# Patient Record
Sex: FEMALE | Race: BLACK OR AFRICAN AMERICAN | NOT HISPANIC OR LATINO | URBAN - METROPOLITAN AREA
[De-identification: names, ages, dates, MRNs, and addresses within clinical notes are randomized per-mention and may not be internally consistent; named-entity substitution may affect disease eponyms.]

---

## 2017-02-09 ENCOUNTER — INPATIENT (INPATIENT)
Facility: HOSPITAL | Age: 22
LOS: 1 days | Discharge: HOME | DRG: 200 | End: 2017-02-11
Attending: HOSPITALIST | Admitting: HOSPITALIST
Payer: COMMERCIAL

## 2017-02-09 VITALS
OXYGEN SATURATION: 97 % | WEIGHT: 259.93 LBS | TEMPERATURE: 99 F | RESPIRATION RATE: 22 BRPM | DIASTOLIC BLOOD PRESSURE: 80 MMHG | SYSTOLIC BLOOD PRESSURE: 116 MMHG | HEART RATE: 99 BPM | HEIGHT: 66 IN

## 2017-02-09 PROBLEM — Z00.00 ENCOUNTER FOR PREVENTIVE HEALTH EXAMINATION: Status: ACTIVE | Noted: 2017-02-09

## 2017-02-09 LAB
ALBUMIN SERPL ELPH-MCNC: 4.3 G/DL — SIGNIFICANT CHANGE UP (ref 3.3–5.2)
ALP SERPL-CCNC: 81 U/L — SIGNIFICANT CHANGE UP (ref 40–120)
ALT FLD-CCNC: 20 U/L — SIGNIFICANT CHANGE UP
ANION GAP SERPL CALC-SCNC: 13 MMOL/L — SIGNIFICANT CHANGE UP (ref 5–17)
APTT BLD: 28.1 SEC — SIGNIFICANT CHANGE UP (ref 27.5–37.4)
AST SERPL-CCNC: 31 U/L — SIGNIFICANT CHANGE UP
BASOPHILS # BLD AUTO: 0 K/UL — SIGNIFICANT CHANGE UP (ref 0–0.2)
BASOPHILS NFR BLD AUTO: 0.1 % — SIGNIFICANT CHANGE UP (ref 0–2)
BILIRUB SERPL-MCNC: 0.5 MG/DL — SIGNIFICANT CHANGE UP (ref 0.4–2)
BUN SERPL-MCNC: 13 MG/DL — SIGNIFICANT CHANGE UP (ref 8–20)
CALCIUM SERPL-MCNC: 9.2 MG/DL — SIGNIFICANT CHANGE UP (ref 8.6–10.2)
CHLORIDE SERPL-SCNC: 99 MMOL/L — SIGNIFICANT CHANGE UP (ref 98–107)
CK MB CFR SERPL CALC: 4.8 NG/ML — SIGNIFICANT CHANGE UP (ref 0–6.7)
CK SERPL-CCNC: 1681 U/L — HIGH (ref 25–170)
CO2 SERPL-SCNC: 28 MMOL/L — SIGNIFICANT CHANGE UP (ref 22–29)
CREAT SERPL-MCNC: 0.9 MG/DL — SIGNIFICANT CHANGE UP (ref 0.5–1.3)
EOSINOPHIL # BLD AUTO: 0 K/UL — SIGNIFICANT CHANGE UP (ref 0–0.5)
EOSINOPHIL NFR BLD AUTO: 0.2 % — SIGNIFICANT CHANGE UP (ref 0–6)
GLUCOSE SERPL-MCNC: 101 MG/DL — SIGNIFICANT CHANGE UP (ref 70–115)
HCG SERPL-ACNC: <2 MIU/ML — SIGNIFICANT CHANGE UP
HCT VFR BLD CALC: 41.2 % — SIGNIFICANT CHANGE UP (ref 37–47)
HGB BLD-MCNC: 14.2 G/DL — SIGNIFICANT CHANGE UP (ref 12–16)
INR BLD: 1.34 RATIO — HIGH (ref 0.88–1.16)
LACTATE BLDV-MCNC: 1.3 MMOL/L — SIGNIFICANT CHANGE UP (ref 0.7–2)
LYMPHOCYTES # BLD AUTO: 13.2 % — LOW (ref 20–55)
LYMPHOCYTES # BLD AUTO: 2.4 K/UL — SIGNIFICANT CHANGE UP (ref 1–4.8)
MCHC RBC-ENTMCNC: 30.3 PG — SIGNIFICANT CHANGE UP (ref 27–31)
MCHC RBC-ENTMCNC: 34.5 G/DL — SIGNIFICANT CHANGE UP (ref 32–36)
MCV RBC AUTO: 88 FL — SIGNIFICANT CHANGE UP (ref 81–99)
MONOCYTES # BLD AUTO: 0.9 K/UL — HIGH (ref 0–0.8)
MONOCYTES NFR BLD AUTO: 4.9 % — SIGNIFICANT CHANGE UP (ref 3–10)
NEUTROPHILS # BLD AUTO: 15 K/UL — HIGH (ref 1.8–8)
NEUTROPHILS NFR BLD AUTO: 81.3 % — HIGH (ref 37–73)
PLATELET # BLD AUTO: 290 K/UL — SIGNIFICANT CHANGE UP (ref 150–400)
POTASSIUM SERPL-MCNC: 3 MMOL/L — LOW (ref 3.5–5.3)
POTASSIUM SERPL-SCNC: 3 MMOL/L — LOW (ref 3.5–5.3)
PROT SERPL-MCNC: 8.1 G/DL — SIGNIFICANT CHANGE UP (ref 6.6–8.7)
PROTHROM AB SERPL-ACNC: 14.8 SEC — HIGH (ref 10–13.1)
RBC # BLD: 4.68 M/UL — SIGNIFICANT CHANGE UP (ref 4.4–5.2)
RBC # FLD: 12.6 % — SIGNIFICANT CHANGE UP (ref 11–15.6)
SODIUM SERPL-SCNC: 140 MMOL/L — SIGNIFICANT CHANGE UP (ref 135–145)
TROPONIN T SERPL-MCNC: <0.01 NG/ML — SIGNIFICANT CHANGE UP (ref 0–0.06)
WBC # BLD: 18.37 K/UL — HIGH (ref 4.8–10.8)
WBC # FLD AUTO: 18.37 K/UL — HIGH (ref 4.8–10.8)

## 2017-02-09 PROCEDURE — 71010: CPT | Mod: 26

## 2017-02-09 PROCEDURE — 93010 ELECTROCARDIOGRAM REPORT: CPT

## 2017-02-09 RX ORDER — SODIUM CHLORIDE 9 MG/ML
1000 INJECTION INTRAMUSCULAR; INTRAVENOUS; SUBCUTANEOUS
Qty: 0 | Refills: 0 | Status: COMPLETED | OUTPATIENT
Start: 2017-02-09 | End: 2017-02-09

## 2017-02-09 RX ORDER — POTASSIUM CHLORIDE 20 MEQ
40 PACKET (EA) ORAL ONCE
Qty: 0 | Refills: 0 | Status: COMPLETED | OUTPATIENT
Start: 2017-02-09 | End: 2017-02-09

## 2017-02-09 RX ADMIN — SODIUM CHLORIDE 2000 MILLILITER(S): 9 INJECTION INTRAMUSCULAR; INTRAVENOUS; SUBCUTANEOUS at 23:50

## 2017-02-09 RX ADMIN — SODIUM CHLORIDE 2000 MILLILITER(S): 9 INJECTION INTRAMUSCULAR; INTRAVENOUS; SUBCUTANEOUS at 22:10

## 2017-02-09 NOTE — ED ADULT TRIAGE NOTE - CHIEF COMPLAINT QUOTE
Patient comes to ER complaining of difficulty breathing, hyperventilating, episode after shoveling snow, resp even/labored, lungs CTAB.

## 2017-02-09 NOTE — ED ADULT NURSE NOTE - OBJECTIVE STATEMENT
received pt AOx3 c/o dyspnea, numbness and tingling in LE, dizziness and weakness, s/p shoveling for 5+hours today. Pt denies chest pain and vomiting but states she felt nausea earlier today. Pt states she takes a diet pill but is unsure of the name and did not take it today. pt respirations even unlabored, MAEx4, neuro intact, will continue to monitor.

## 2017-02-10 DIAGNOSIS — J98.2 INTERSTITIAL EMPHYSEMA: ICD-10-CM

## 2017-02-10 DIAGNOSIS — E87.6 HYPOKALEMIA: ICD-10-CM

## 2017-02-10 DIAGNOSIS — M62.82 RHABDOMYOLYSIS: ICD-10-CM

## 2017-02-10 LAB
ANION GAP SERPL CALC-SCNC: 12 MMOL/L — SIGNIFICANT CHANGE UP (ref 5–17)
APPEARANCE UR: CLEAR — SIGNIFICANT CHANGE UP
BACTERIA # UR AUTO: ABNORMAL
BASOPHILS # BLD AUTO: 0 K/UL — SIGNIFICANT CHANGE UP (ref 0–0.2)
BASOPHILS NFR BLD AUTO: 0.1 % — SIGNIFICANT CHANGE UP (ref 0–2)
BILIRUB UR-MCNC: NEGATIVE — SIGNIFICANT CHANGE UP
BUN SERPL-MCNC: 8 MG/DL — SIGNIFICANT CHANGE UP (ref 8–20)
CALCIUM SERPL-MCNC: 9 MG/DL — SIGNIFICANT CHANGE UP (ref 8.6–10.2)
CHLORIDE SERPL-SCNC: 101 MMOL/L — SIGNIFICANT CHANGE UP (ref 98–107)
CK MB CFR SERPL CALC: 4.8 NG/ML — SIGNIFICANT CHANGE UP (ref 0–6.7)
CK SERPL-CCNC: 1558 U/L — HIGH (ref 25–170)
CO2 SERPL-SCNC: 24 MMOL/L — SIGNIFICANT CHANGE UP (ref 22–29)
COLOR SPEC: YELLOW — SIGNIFICANT CHANGE UP
CREAT SERPL-MCNC: 0.65 MG/DL — SIGNIFICANT CHANGE UP (ref 0.5–1.3)
DIFF PNL FLD: ABNORMAL
EOSINOPHIL # BLD AUTO: 0.1 K/UL — SIGNIFICANT CHANGE UP (ref 0–0.5)
EOSINOPHIL NFR BLD AUTO: 1 % — SIGNIFICANT CHANGE UP (ref 0–6)
EPI CELLS # UR: SIGNIFICANT CHANGE UP
GLUCOSE SERPL-MCNC: 72 MG/DL — SIGNIFICANT CHANGE UP (ref 70–115)
GLUCOSE UR QL: NEGATIVE MG/DL — SIGNIFICANT CHANGE UP
HCT VFR BLD CALC: 36.7 % — LOW (ref 37–47)
HGB BLD-MCNC: 12.6 G/DL — SIGNIFICANT CHANGE UP (ref 12–16)
KETONES UR-MCNC: ABNORMAL
LACTATE BLDV-MCNC: 0.8 MMOL/L — SIGNIFICANT CHANGE UP (ref 0.7–2)
LEUKOCYTE ESTERASE UR-ACNC: ABNORMAL
LYMPHOCYTES # BLD AUTO: 2.3 K/UL — SIGNIFICANT CHANGE UP (ref 1–4.8)
LYMPHOCYTES # BLD AUTO: 23.7 % — SIGNIFICANT CHANGE UP (ref 20–55)
MAGNESIUM SERPL-MCNC: 2.3 MG/DL — SIGNIFICANT CHANGE UP (ref 1.8–2.5)
MCHC RBC-ENTMCNC: 30.3 PG — SIGNIFICANT CHANGE UP (ref 27–31)
MCHC RBC-ENTMCNC: 34.3 G/DL — SIGNIFICANT CHANGE UP (ref 32–36)
MCV RBC AUTO: 88.2 FL — SIGNIFICANT CHANGE UP (ref 81–99)
MONOCYTES # BLD AUTO: 0.6 K/UL — SIGNIFICANT CHANGE UP (ref 0–0.8)
MONOCYTES NFR BLD AUTO: 6.7 % — SIGNIFICANT CHANGE UP (ref 3–10)
NEUTROPHILS # BLD AUTO: 6.5 K/UL — SIGNIFICANT CHANGE UP (ref 1.8–8)
NEUTROPHILS NFR BLD AUTO: 68.4 % — SIGNIFICANT CHANGE UP (ref 37–73)
NITRITE UR-MCNC: NEGATIVE — SIGNIFICANT CHANGE UP
PH UR: 6 — SIGNIFICANT CHANGE UP (ref 4.8–8)
PLATELET # BLD AUTO: 257 K/UL — SIGNIFICANT CHANGE UP (ref 150–400)
POTASSIUM SERPL-MCNC: 3.6 MMOL/L — SIGNIFICANT CHANGE UP (ref 3.5–5.3)
POTASSIUM SERPL-SCNC: 3.6 MMOL/L — SIGNIFICANT CHANGE UP (ref 3.5–5.3)
PROT UR-MCNC: 30 MG/DL
RBC # BLD: 4.16 M/UL — LOW (ref 4.4–5.2)
RBC # FLD: 12.7 % — SIGNIFICANT CHANGE UP (ref 11–15.6)
RBC CASTS # UR COMP ASSIST: ABNORMAL /HPF (ref 0–4)
SODIUM SERPL-SCNC: 137 MMOL/L — SIGNIFICANT CHANGE UP (ref 135–145)
SP GR SPEC: 1.01 — SIGNIFICANT CHANGE UP (ref 1.01–1.02)
TROPONIN T SERPL-MCNC: <0.01 NG/ML — SIGNIFICANT CHANGE UP (ref 0–0.06)
UROBILINOGEN FLD QL: 4 MG/DL
WBC # BLD: 9.53 K/UL — SIGNIFICANT CHANGE UP (ref 4.8–10.8)
WBC # FLD AUTO: 9.53 K/UL — SIGNIFICANT CHANGE UP (ref 4.8–10.8)
WBC UR QL: ABNORMAL

## 2017-02-10 PROCEDURE — 99223 1ST HOSP IP/OBS HIGH 75: CPT

## 2017-02-10 PROCEDURE — 71275 CT ANGIOGRAPHY CHEST: CPT | Mod: 26

## 2017-02-10 PROCEDURE — 99285 EMERGENCY DEPT VISIT HI MDM: CPT

## 2017-02-10 RX ORDER — PIPERACILLIN AND TAZOBACTAM 4; .5 G/20ML; G/20ML
3.38 INJECTION, POWDER, LYOPHILIZED, FOR SOLUTION INTRAVENOUS ONCE
Qty: 0 | Refills: 0 | Status: COMPLETED | OUTPATIENT
Start: 2017-02-10 | End: 2017-02-10

## 2017-02-10 RX ORDER — ACETAMINOPHEN 500 MG
650 TABLET ORAL EVERY 6 HOURS
Qty: 0 | Refills: 0 | Status: DISCONTINUED | OUTPATIENT
Start: 2017-02-10 | End: 2017-02-11

## 2017-02-10 RX ORDER — POTASSIUM CHLORIDE 20 MEQ
40 PACKET (EA) ORAL ONCE
Qty: 0 | Refills: 0 | Status: COMPLETED | OUTPATIENT
Start: 2017-02-10 | End: 2017-02-10

## 2017-02-10 RX ORDER — LACTOBACILLUS ACIDOPHILUS 100MM CELL
1 CAPSULE ORAL
Qty: 0 | Refills: 0 | Status: DISCONTINUED | OUTPATIENT
Start: 2017-02-10 | End: 2017-02-11

## 2017-02-10 RX ORDER — SODIUM CHLORIDE 9 MG/ML
1000 INJECTION, SOLUTION INTRAVENOUS
Qty: 0 | Refills: 0 | Status: DISCONTINUED | OUTPATIENT
Start: 2017-02-10 | End: 2017-02-11

## 2017-02-10 RX ORDER — PIPERACILLIN AND TAZOBACTAM 4; .5 G/20ML; G/20ML
3.38 INJECTION, POWDER, LYOPHILIZED, FOR SOLUTION INTRAVENOUS EVERY 8 HOURS
Qty: 0 | Refills: 0 | Status: DISCONTINUED | OUTPATIENT
Start: 2017-02-10 | End: 2017-02-11

## 2017-02-10 RX ORDER — VANCOMYCIN HCL 1 G
1750 VIAL (EA) INTRAVENOUS EVERY 12 HOURS
Qty: 0 | Refills: 0 | Status: DISCONTINUED | OUTPATIENT
Start: 2017-02-10 | End: 2017-02-11

## 2017-02-10 RX ORDER — MAGNESIUM SULFATE 500 MG/ML
2 VIAL (ML) INJECTION ONCE
Qty: 0 | Refills: 0 | Status: COMPLETED | OUTPATIENT
Start: 2017-02-10 | End: 2017-02-10

## 2017-02-10 RX ADMIN — PIPERACILLIN AND TAZOBACTAM 200 GRAM(S): 4; .5 INJECTION, POWDER, LYOPHILIZED, FOR SOLUTION INTRAVENOUS at 03:58

## 2017-02-10 RX ADMIN — Medication 250 MILLIGRAM(S): at 22:08

## 2017-02-10 RX ADMIN — Medication 40 MILLIEQUIVALENT(S): at 00:52

## 2017-02-10 RX ADMIN — Medication 250 MILLIGRAM(S): at 05:19

## 2017-02-10 RX ADMIN — Medication 50 GRAM(S): at 00:52

## 2017-02-10 RX ADMIN — SODIUM CHLORIDE 250 MILLILITER(S): 9 INJECTION, SOLUTION INTRAVENOUS at 04:39

## 2017-02-10 RX ADMIN — PIPERACILLIN AND TAZOBACTAM 25 GRAM(S): 4; .5 INJECTION, POWDER, LYOPHILIZED, FOR SOLUTION INTRAVENOUS at 14:40

## 2017-02-10 RX ADMIN — SODIUM CHLORIDE 200 MILLILITER(S): 9 INJECTION, SOLUTION INTRAVENOUS at 10:42

## 2017-02-10 RX ADMIN — PIPERACILLIN AND TAZOBACTAM 25 GRAM(S): 4; .5 INJECTION, POWDER, LYOPHILIZED, FOR SOLUTION INTRAVENOUS at 23:26

## 2017-02-10 RX ADMIN — SODIUM CHLORIDE 200 MILLILITER(S): 9 INJECTION, SOLUTION INTRAVENOUS at 23:36

## 2017-02-10 RX ADMIN — SODIUM CHLORIDE 2000 MILLILITER(S): 9 INJECTION INTRAMUSCULAR; INTRAVENOUS; SUBCUTANEOUS at 00:52

## 2017-02-10 RX ADMIN — Medication 1 TABLET(S): at 18:55

## 2017-02-10 NOTE — DISCHARGE NOTE ADULT - HOSPITAL COURSE
21 years old female with no known PMH comes with shortness of breath. As per patient, she shovelled snow for 5 hours and after finishing went to take shower. While taking shower she started feeling dizzy and short of breath associated with numbness and tingling in arms and face. She was also feeling very nauseous but denies any vomiting. The whole episode lasted for 45 minutes. Family called EMS and she was brought to ER.   Denies any fever, recent infection (chest, neck, ear, nose or throat). Denies any recent contact. Denies any chest pain, pressure or palpitations.   Found to have Pneumomediastinum on CTA Chest.  IV hydration for rhabdomyolysis 21 years old female with no known PMH comes with shortness of breath. As per patient, she shovelled snow for 5 hours and after finishing went to take shower. While taking shower she started feeling dizzy and short of breath associated with numbness and tingling in arms and face. She was also feeling very nauseous but denies any vomiting. The whole episode lasted for 45 minutes. Family called EMS and she was brought to ER.   Denies any fever, recent infection (chest, neck, ear, nose or throat). Denies any recent contact. Denies any chest pain, pressure or palpitations.   Found to have Pneumomediastinum on CTA Chest.  IV hydration for rhabdomyolysis     cleared by ct surgery no need for repeat ct  patient advised to inc hydration and bed rest. ck improved

## 2017-02-10 NOTE — ED PROVIDER NOTE - HEME/LYMPH [-], MLM
no travel, no trauma, no smoking, no extremity swelling, no personal or family hx of VTE, no exogenous estrogen/no easy bleeding/no easy bruising

## 2017-02-10 NOTE — H&P ADULT. - NEUROLOGICAL DETAILS
responds to verbal commands/sensation intact/normal strength/cranial nerves intact/alert and oriented x 3

## 2017-02-10 NOTE — DISCHARGE NOTE ADULT - CARE PROVIDERS DIRECT ADDRESSES
,DirectAddress_Unknown,DirectAddress_Unknown ,DirectAddress_Unknown,[email or web address],DirectAddress_Unknown

## 2017-02-10 NOTE — H&P ADULT. - ASSESSMENT
21 years old female with no known PMH comes with shortness of breath. As per patient, she shovelled snow for 5 hours and after finishing went to take shower. While taking shower she started feeling dizzy and short of breath associated with numbness and tingling in arms and face. She was also feeling very nauseous but denies any vomiting. The whole episode lasted for 45 minutes. Family called EMS and she was brought to ER.   Denies any fever, recent infection (chest, neck, ear, nose or throat). Denies any recent contact. Denies any chest pain, pressure or palpitations.   Found to have Pneumomediastinum on CTA Chest.    1) Pneumomediastinum  - Seen by CTS -- recommended broad spectrum antibiotics and repeat CT Chest in 24-48 hours  - Got Vacno and Zosyn in ER -- will continue for now  2) Leukocytosis  - Cultures sent  - Vanco & Zosyn till cultures are back  3) Rhabdomyolysis  - Got 1 liter bolus. Will continue IVF at 200 ml/hour  - Monitor renal function and CPK  4) Hypomagnesemia and Hypokalemia  - Magnesium 2 gm and KCl 40 mEq given in ER. Will give one more dose of KCl.  5) Abnormal UA  - No signs of UTI  - Patient is due for her periods and was having some spotting.  DVT Prophylaxis -- Early ambulation.                              VCD boots while in bed.

## 2017-02-10 NOTE — DISCHARGE NOTE ADULT - PATIENT PORTAL LINK FT
“You can access the [facility] Patient Portal, offered by [facility], by registering with the following website: [email or web address]”

## 2017-02-10 NOTE — PROGRESS NOTE ADULT - ASSESSMENT
20 yo Female no significant PMH was shoveling snow 2/9 for 5 hours went inside to take a shower began to feel SOB was breathing heavily. Pt BIBEMS hypotensive in ED80/40s given fluid bolus which she responded. Pt interviewed at bedside doing well no complaints of CP or SIB at this time. Pt denies any LOC, syncope headaches vomit ting or retching  during episode of SOB at home.
21 years old female with no known PMH comes with shortness of breath. As per patient, she shovelled snow for 5 hours and after finishing went to take shower. While taking shower she started feeling dizzy and short of breath associated with numbness and tingling in arms and face. She was also feeling very nauseous but denies any vomiting. The whole episode lasted for 45 minutes. Family called EMS and she was brought to ER.   Denies any fever, recent infection (chest, neck, ear, nose or throat). Denies any recent contact. Denies any chest pain, pressure or palpitations.   Found to have Pneumomediastinum on CTA Chest.  IV hydration for rhabdomyolysis

## 2017-02-10 NOTE — DISCHARGE NOTE ADULT - PROVIDER TOKENS
FREE:[LAST:[PMD],PHONE:[(   )    -],FAX:[(   )    -]] FREE:[LAST:[PMD],PHONE:[(   )    -],FAX:[(   )    -]],TOKEN:'[id number]'

## 2017-02-10 NOTE — PROGRESS NOTE ADULT - PROBLEM SELECTOR PLAN 1
strict NPO  broad spectrum abx's  admit to medicine   IV hydration  repeat CT scan in 24-48 hours   CT-Surgery will follow as consulting service

## 2017-02-10 NOTE — DISCHARGE NOTE ADULT - CARE PROVIDER_API CALL
YRUI,   Phone: (   )    -  Fax: (   )    - PMD,   Phone: (   )    -  Fax: (   )    -    Toney Pisano), Surgery; Thoracic Surgery  [address] [place] [address]  Phone: [phone]  Fax: [phone]

## 2017-02-10 NOTE — H&P ADULT. - NEGATIVE GENERAL GENITOURINARY SYMPTOMS
no flank pain R/no dysuria/normal urinary frequency/no hematuria/no flank pain L/no urine discoloration/no urinary hesitancy

## 2017-02-10 NOTE — ED ADULT NURSE REASSESSMENT NOTE - NS ED NURSE REASSESS COMMENT FT1
Assumed pt care @ 2300 from DEMOND Sandoval, Pt is A&Ox3 in NAD resp even and unlabored. Pt denies complaint.  IV clean dry and intact, running without difficulty. Safety maintained, Bed locked in lowest position. Will continue to monitor.

## 2017-02-10 NOTE — PROGRESS NOTE ADULT - SUBJECTIVE AND OBJECTIVE BOX
Surgeon: Dr. Pisano    Consult requesting by: Dr. Joyner     HISTORY OF PRESENT ILLNESS:  22 yo Female no significant PMH was shoveling snow  for 5 hours went inside to take a shower began to feel SOB was breathing heavily. Pt BIBEMS hypotensive in ED80/40s given fluid bolus which she responded. Pt interviewed at bedside doing well no complaints of CP or SIB at this time. Pt denies any LOC, syncope headaches vomit ting or retching  during episode of SOB at home.     PAST MEDICAL & SURGICAL HISTORY:  No pertinent past medical history  No significant past surgical history      MEDICATIONS  (STANDING):  piperacillin/tazobactam IVPB. 3.375Gram(s) IV Intermittent once    MEDICATIONS  (PRN):      Allergies    No Known Allergies    Intolerances          Review of Systems  CONSTITUTIONAL:  Fevers[ ] chills[ ] sweats[ ] fatigue[ ] weight loss[ ] weight gain [ ]                                     NEGATIVE [x]   NEURO:  parathesias[ ] seizures [ ]  syncope [ ]  confusion [ ]                                                                                NEGATIVE[ x]   EYES: glasses[ ]  blurry vision[ ]  discharge[ ] pain[ ] glaucoma [ ]                                                                          NEGATIVE[x ]   ENMT:  difficulty hearing [ ]  vertigo[ ]  dysphagia[ ] epistaxis[ ] recent dental work [ ]                                    NEGATIVE[ x]   CV:  chest pain[ ] palpitations[ ] ROQUE [ ] diaphoresis [ ]                                                                                           NEGATIVE[ x]   RESPIRATORY:  wheezing[ ] SOB[ ] cough [ ] sputum[ ] hemoptysis[ ]                                                                  NEGATIVE[x ]   GI:  nausea[ ]  vommiting [ ]  diarrhea[ ] constipation [ ] melena [ ]                                                                      NEGATIVE[x ]   : hematuria[ ]  dysuria[ ] urgency[ ] incontinence[ ]                                                                                            NEGATIVE[x ]   MUSKULOSKELETAL:  arthritis[ ]  joint swelling [ ] muscle weakness [ ]                                                                NEGATIVE[x ]   SKIN/BREAST:  rash[ ] itching [ ]  hair loss[ ] masses[ ]                                                                                              NEGATIVE[x ]   PSYCH:  dementia [ ] depresion [ ] anxiety[ ]                                                                                                               NEGATIVE[x ]   HEME/LYMPH:  bruises easily[ ] enlarged lymph nodes[ ] tender lymph nodes[ ]                                               NEGATIVE[x ]   ENDOCRINE:  cold intolerance[ ] heat intolerance[ ] polydipsia[ ]                                                                          NEGATIVE[x ]     PHYSICAL EXAM  Vital Signs Last 24 Hrs  T(C): 36.6, Max: 37.1 ( @ 20:51)  T(F): 97.8, Max: 98.7 (02-09 @ 20:51)  HR: 75 (75 - 99)  BP: 110/68 (110/68 - 113/77)  BP(mean): --  RR: 20 (20 - 22)  SpO2: 100% (97% - 100%)    CONSTITUTIONAL:                                                               N: AAOx3 in NAD  Pulm: CTA b/l  CV: RRR positive S and S2  GI: NT/ND  Ext: DP 2+    LABS:                        14.2   18.37 )-----------( 290      ( 2017 22:53 )             41.2     2017 22:53    140    |  99     |  13.0   ----------------------------<  101    3.0     |  28.0   |  0.90     Ca    9.2        2017 22:53  Mg     1.7       2017 22:53    TPro  8.1    /  Alb  4.3    /  TBili  0.5    /  DBili  x      /  AST  31     /  ALT  20     /  AlkPhos  81     2017 22:53    PT/INR - ( 2017 22:53 )   PT: 14.8 sec;   INR: 1.34 ratio         PTT - ( 2017 22:53 )  PTT:28.1 sec  Urinalysis Basic - ( 10 Feb 2017 01:16 )    Color: Yellow / Appearance: Clear / S.015 / pH: x  Gluc: x / Ketone: Moderate  / Bili: Negative / Urobili: 4 mg/dL   Blood: x / Protein: 30 mg/dL / Nitrite: Negative   Leuk Esterase: Trace / RBC: 3-5 /HPF / WBC 11-25   Sq Epi: x / Non Sq Epi: Few / Bacteria: Few      CARDIAC MARKERS ( 2017 22:53 )  x     / <0.01 ng/mL / 1681 U/L / x     / 4.8 ng/mL          CT angio    IMPRESSION:    Pneumomediastinum without discrete etiology identified on CT imaging.    No pulmonary embolus.

## 2017-02-10 NOTE — PROGRESS NOTE ADULT - SUBJECTIVE AND OBJECTIVE BOX
INTERVAL HPI/OVERNIGHT EVENTS:    CC: Chest pain    Patient seen and examined, shortness of breath and chest pain improved. Still has swelling of her legs.       Vital Signs Last 24 Hrs  T(C): 36.5, Max: 37.1 ( @ 20:51)  T(F): 97.7, Max: 98.7 ( @ 20:51)  HR: 74 (66 - 99)  BP: 100/61 (100/61 - 113/77)  BP(mean): --  RR: 20 (18 - 22)  SpO2: 100% (97% - 100%)    PHYSICAL EXAM:    GENERAL: NAD, well-groomed, well-developed  HEAD:  Atraumatic, Normocephalic  EYES: EOMI, PERRLA, conjunctiva and sclera clear  ENMT: Moist mucous membranes  NECK: Supple, No JVD  CHEST/LUNG: Clear to auscultation bilaterally; No rales, rhonchi, wheezing, or rubs  HEART: Regular rate and rhythm; No murmurs, rubs, or gallops  ABDOMEN: Soft, Nontender, Nondistended; Bowel sounds present  EXTREMITIES: Swelling of bilateral thighs         MEDICATIONS  (STANDING):  vancomycin  IVPB 1750milliGRAM(s) IV Intermittent every 12 hours  lactated ringers. 1000milliLiter(s) IV Continuous <Continuous>  lactobacillus acidophilus 1Tablet(s) Oral two times a day with meals  potassium chloride    Tablet ER 40milliEquivalent(s) Oral once  piperacillin/tazobactam IVPB. 3.375Gram(s) IV Intermittent every 8 hours    MEDICATIONS  (PRN):  acetaminophen   Tablet. 650milliGRAM(s) Oral every 6 hours PRN Headache or Bodyache      Allergies    No Known Allergies    Intolerances          LABS:                          12.6   9.53  )-----------( 257      ( 10 Feb 2017 10:07 )             36.7     2017 22:53    140    |  99     |  13.0   ----------------------------<  101    3.0     |  28.0   |  0.90     Ca    9.2        2017 22:53  Mg     1.7       2017 22:53    TPro  8.1    /  Alb  4.3    /  TBili  0.5    /  DBili  x      /  AST  31     /  ALT  20     /  AlkPhos  81     2017 22:53    PT/INR - ( 2017 22:53 )   PT: 14.8 sec;   INR: 1.34 ratio         PTT - ( 2017 22:53 )  PTT:28.1 sec  Urinalysis Basic - ( 10 Feb 2017 01:16 )    Color: Yellow / Appearance: Clear / S.015 / pH: x  Gluc: x / Ketone: Moderate  / Bili: Negative / Urobili: 4 mg/dL   Blood: x / Protein: 30 mg/dL / Nitrite: Negative   Leuk Esterase: Trace / RBC: 3-5 /HPF / WBC 11-25   Sq Epi: x / Non Sq Epi: Few / Bacteria: Few        RADIOLOGY & ADDITIONAL TESTS:

## 2017-02-10 NOTE — ED ADULT NURSE REASSESSMENT NOTE - NS ED NURSE REASSESS COMMENT FT1
Patient awake alert and oriented x3. resp even and unlabored. denies pain. nsr on cardiac monitor. awaiting dispo.

## 2017-02-10 NOTE — ED ADULT NURSE REASSESSMENT NOTE - NS ED NURSE REASSESS COMMENT FT1
Pt is A&Ox3 in NAD resp even and unlabored. Pt denies complaint.  IV clean dry and intact, running IV ABX without difficulty. Thoracic surgery NP here and spoke with pt. Pt aware of strict NPO status. Safety maintained, Bed locked in lowest position. Pt and family aware of plan of care. Will continue to monitor.

## 2017-02-10 NOTE — H&P ADULT. - HISTORY OF PRESENT ILLNESS
21 years old female with no known PMH comes with shortness of breath. As per patient, she shovelled snow for 5 hours and after finishing went to take shower. While taking shower she started feeling dizzy and short of breath associated with numbness and tingling in arms and face. She was also feeling very nauseous but denies any vomiting. The whole episode lasted for 45 minutes. Family called EMS and she was brought to ER.   Denies any fever, recent infection (chest, neck, ear, nose or throat). Denies any recent contact. Denies any chest pain, pressure or palpitations.   Denies similar episodes in past.

## 2017-02-10 NOTE — ED ADULT NURSE REASSESSMENT NOTE - NS ED NURSE REASSESS COMMENT FT1
Patient awake alert and oriented x3. resp even and unlabored. zosyn in progrress. denies any complaints. moves all extremities. awaiting dispo.

## 2017-02-10 NOTE — ED PROVIDER NOTE - OBJECTIVE STATEMENT
This is a 21F w/Hx of morbid obesity who complains of difficulty breathing. Pt had not anythign to eat today and went outside to shovel x 5 hours. She was poorly dressed. She developed a lot of pain in her thighs and legs,s he thiks from lifting. She went in to take a  and suddenly felt dizzy and lightheaded and very short of breath and felt like she could not catch her breath. She had pleuritic pain I her R-side and back. She denies nausea, vomiting, diarrhea. She denies trauma. No CP, no family hx of VTE, early onset cardiac disease, connective tissue disease. Denies drug use or smoking or asthma. Is taking "a natural appetite suppressant and a weight loss pill" provided bya  weight loss clinic. She does not know what they contain.

## 2017-02-10 NOTE — H&P ADULT. - FAMILY HISTORY
Grandparent  Still living? Unknown  Family history of diabetes mellitus, Age at diagnosis: Age Unknown  Family history of hypertension, Age at diagnosis: Age Unknown  Family history of glaucoma, Age at diagnosis: Age Unknown     Mother  Still living? Unknown  Family history of glaucoma, Age at diagnosis: Age Unknown  Family history of hyperlipidemia, Age at diagnosis: Age Unknown

## 2017-02-10 NOTE — ED PROVIDER NOTE - PHYSICAL EXAMINATION
Attending MD Joyner:   VITALS: reviewed initial BP low and borderline fast BP, toehrwise well  GEN: mildly ill appearing, A & O x 4  HEAD/EYES: NCAT, PERRL, EOMI, anicteric sclerae  ENT: mucus membranes dry, oropharynx WNL, trachea midline, flat neck veins, neck supple, tracheal midline  CHEST: lungs CTA with equal breath sounds bilaterally, chest wall nontender and atraumatic, no crepitance, mild tachypnea, no increased WOB  CV: normal rate, heart with reg rhythm S1, S2, no murmur; distal pulses intact and symmetric bilaterally  ABDOMEN: obese, normoactive bowel sounds, soft, ND, nontender, no masses  : no CVAT, no suprapubic tenderness or fullness  MSK: bilatearl glueal swellign and tenderness reproducing "thigh" pain of complaint, no edema or swellign of calf or distal thighs, neck has no midline tenderness, deformity, or stepoff, and is ranged painlessly.  SKIN: no rash, no bruising, no cyanosis. color appropriate for ethnicity  NEURO: alert, mentating appropriately, no facial asymmetry. gross sensation, motor, coordination are intact  PSYCH: Affect appropriate

## 2017-02-10 NOTE — ED ADULT NURSE REASSESSMENT NOTE - NS ED NURSE REASSESS COMMENT FT1
pt received asleep; easily arousable. alert and oriented x3. moved to room with cardiac monitor; nsr on cardiac monitor. denies pain at this time. moves all extremities. awaiting bed. antibiotics in progress.

## 2017-02-10 NOTE — PROGRESS NOTE ADULT - PROBLEM SELECTOR PLAN 2
Discussed with Dr Pisano, minimal pneumomed, no need for follow up CT   Monitor for worsening symptoms Discussed with Dr Pisano, minimal pneumomed, no need for follow up CT   IV Vanco/zosyn x 24 hours. D/c in AM  Monitor for worsening symptoms

## 2017-02-10 NOTE — ED PROVIDER NOTE - PROGRESS NOTE DETAILS
Pt improved with fluids, other than leukocytosis, labs reassuring, D-dimer pending, though low suspicion of CTA. Mild rhabdo consistent with bilateral buttock tenderness and swelling and heavy labor. Will give fluids recheck labs D-dimer positive, will order CTA CTA w/pneumomediastinum, pt stable. Still with "chest tightness" though feels better. Ordered antibiotics, will consult surgery. Unclear etiology of pneumomediastinum, no CP Seen by thoracic surgery PA and staffed with Dr. Pisano. Think low risk of mediastinitus, low suspicion will need surgical intervention. Recommending strict NPO, IVF, Vanc/Zosyn/Clinda. They are declining admit and recommending admit to medicien. Additionally, CK is clearing, but slowly, IVF started for patient. She hasminimal discomfort and all questions have been answered. Pt signed out to Dr. Michaud of medicine.

## 2017-02-10 NOTE — PROGRESS NOTE ADULT - SUBJECTIVE AND OBJECTIVE BOX
Pt with mild med air found incidentally  Unclear significance  Would advance diet as tolerated and abx for 24 hrs.    Suspect ready for DC tomorrow

## 2017-02-10 NOTE — H&P ADULT. - NEGATIVE CARDIOVASCULAR SYMPTOMS
no orthopnea/no palpitations/no dyspnea on exertion/no chest pain/no paroxysmal nocturnal dyspnea/no peripheral edema

## 2017-02-10 NOTE — ED PROVIDER NOTE - MEDICAL DECISION MAKING DETAILS
Hypotensive, looks dry. Gluteal swelling, suspect rhabdo. Young but obese, will give fluids. Appears dry having not eaten and not worked. Given dyspnea and post-exertional state and health issues will get EKG and troponin, though low risk (and because expected CK elevation). Will get CXR. Low suspicion for PE but given complaints of pleuritic chest/back pain will send D-dimer.

## 2017-02-11 VITALS
DIASTOLIC BLOOD PRESSURE: 74 MMHG | RESPIRATION RATE: 18 BRPM | SYSTOLIC BLOOD PRESSURE: 124 MMHG | HEART RATE: 75 BPM | TEMPERATURE: 98 F

## 2017-02-11 LAB
ANION GAP SERPL CALC-SCNC: 13 MMOL/L — SIGNIFICANT CHANGE UP (ref 5–17)
BUN SERPL-MCNC: 10 MG/DL — SIGNIFICANT CHANGE UP (ref 8–20)
CALCIUM SERPL-MCNC: 9 MG/DL — SIGNIFICANT CHANGE UP (ref 8.6–10.2)
CHLORIDE SERPL-SCNC: 103 MMOL/L — SIGNIFICANT CHANGE UP (ref 98–107)
CK SERPL-CCNC: 2844 U/L — HIGH (ref 25–170)
CO2 SERPL-SCNC: 24 MMOL/L — SIGNIFICANT CHANGE UP (ref 22–29)
CREAT SERPL-MCNC: 1.29 MG/DL — SIGNIFICANT CHANGE UP (ref 0.5–1.3)
GLUCOSE SERPL-MCNC: 103 MG/DL — SIGNIFICANT CHANGE UP (ref 70–115)
HCT VFR BLD CALC: 33.4 % — LOW (ref 37–47)
HGB BLD-MCNC: 11.6 G/DL — LOW (ref 12–16)
MCHC RBC-ENTMCNC: 30.2 PG — SIGNIFICANT CHANGE UP (ref 27–31)
MCHC RBC-ENTMCNC: 34.7 G/DL — SIGNIFICANT CHANGE UP (ref 32–36)
MCV RBC AUTO: 87 FL — SIGNIFICANT CHANGE UP (ref 81–99)
PLATELET # BLD AUTO: 243 K/UL — SIGNIFICANT CHANGE UP (ref 150–400)
POTASSIUM SERPL-MCNC: 3.6 MMOL/L — SIGNIFICANT CHANGE UP (ref 3.5–5.3)
POTASSIUM SERPL-SCNC: 3.6 MMOL/L — SIGNIFICANT CHANGE UP (ref 3.5–5.3)
RBC # BLD: 3.84 M/UL — LOW (ref 4.4–5.2)
RBC # FLD: 12 % — SIGNIFICANT CHANGE UP (ref 11–15.6)
SODIUM SERPL-SCNC: 140 MMOL/L — SIGNIFICANT CHANGE UP (ref 135–145)
WBC # BLD: 8.86 K/UL — SIGNIFICANT CHANGE UP (ref 4.8–10.8)
WBC # FLD AUTO: 8.86 K/UL — SIGNIFICANT CHANGE UP (ref 4.8–10.8)

## 2017-02-11 PROCEDURE — 99239 HOSP IP/OBS DSCHRG MGMT >30: CPT

## 2017-02-11 RX ADMIN — PIPERACILLIN AND TAZOBACTAM 25 GRAM(S): 4; .5 INJECTION, POWDER, LYOPHILIZED, FOR SOLUTION INTRAVENOUS at 08:43

## 2017-02-11 RX ADMIN — Medication 650 MILLIGRAM(S): at 08:44

## 2017-02-11 RX ADMIN — Medication 650 MILLIGRAM(S): at 01:00

## 2017-02-11 RX ADMIN — Medication 650 MILLIGRAM(S): at 01:58

## 2017-02-11 RX ADMIN — Medication 250 MILLIGRAM(S): at 06:09

## 2017-02-11 RX ADMIN — SODIUM CHLORIDE 200 MILLILITER(S): 9 INJECTION, SOLUTION INTRAVENOUS at 06:32

## 2017-02-15 LAB
CULTURE RESULTS: SIGNIFICANT CHANGE UP
CULTURE RESULTS: SIGNIFICANT CHANGE UP
SPECIMEN SOURCE: SIGNIFICANT CHANGE UP
SPECIMEN SOURCE: SIGNIFICANT CHANGE UP

## 2017-02-20 PROCEDURE — 85379 FIBRIN DEGRADATION QUANT: CPT

## 2017-02-20 PROCEDURE — 96375 TX/PRO/DX INJ NEW DRUG ADDON: CPT | Mod: XU

## 2017-02-20 PROCEDURE — 71275 CT ANGIOGRAPHY CHEST: CPT

## 2017-02-20 PROCEDURE — 85610 PROTHROMBIN TIME: CPT

## 2017-02-20 PROCEDURE — 83735 ASSAY OF MAGNESIUM: CPT

## 2017-02-20 PROCEDURE — 84702 CHORIONIC GONADOTROPIN TEST: CPT

## 2017-02-20 PROCEDURE — 83605 ASSAY OF LACTIC ACID: CPT

## 2017-02-20 PROCEDURE — 93005 ELECTROCARDIOGRAM TRACING: CPT

## 2017-02-20 PROCEDURE — 71045 X-RAY EXAM CHEST 1 VIEW: CPT

## 2017-02-20 PROCEDURE — 82553 CREATINE MB FRACTION: CPT

## 2017-02-20 PROCEDURE — 87040 BLOOD CULTURE FOR BACTERIA: CPT

## 2017-02-20 PROCEDURE — 96374 THER/PROPH/DIAG INJ IV PUSH: CPT | Mod: XU

## 2017-02-20 PROCEDURE — 36415 COLL VENOUS BLD VENIPUNCTURE: CPT

## 2017-02-20 PROCEDURE — 80053 COMPREHEN METABOLIC PANEL: CPT

## 2017-02-20 PROCEDURE — 85027 COMPLETE CBC AUTOMATED: CPT

## 2017-02-20 PROCEDURE — 85730 THROMBOPLASTIN TIME PARTIAL: CPT

## 2017-02-20 PROCEDURE — 84484 ASSAY OF TROPONIN QUANT: CPT

## 2017-02-20 PROCEDURE — 80048 BASIC METABOLIC PNL TOTAL CA: CPT

## 2017-02-20 PROCEDURE — 81001 URINALYSIS AUTO W/SCOPE: CPT

## 2017-02-20 PROCEDURE — 82550 ASSAY OF CK (CPK): CPT

## 2017-02-20 PROCEDURE — 99285 EMERGENCY DEPT VISIT HI MDM: CPT | Mod: 25

## 2018-06-13 ENCOUNTER — HOSPITAL ENCOUNTER (EMERGENCY)
Facility: HOSPITAL | Age: 23
Discharge: HOME | End: 2018-06-13
Payer: COMMERCIAL

## 2018-06-13 ENCOUNTER — HOSPITAL ENCOUNTER (EMERGENCY)
Facility: HOSPITAL | Age: 23
Discharge: HOME | End: 2018-06-13
Attending: EMERGENCY MEDICINE | Admitting: EMERGENCY MEDICINE
Payer: COMMERCIAL

## 2018-06-13 ENCOUNTER — APPOINTMENT (EMERGENCY)
Dept: CT IMAGING | Facility: HOSPITAL | Age: 23
End: 2018-06-13
Payer: COMMERCIAL

## 2018-06-13 ENCOUNTER — APPOINTMENT (EMERGENCY)
Dept: RADIOLOGY | Facility: HOSPITAL | Age: 23
End: 2018-06-13
Payer: COMMERCIAL

## 2018-06-13 VITALS
WEIGHT: 291.01 LBS | RESPIRATION RATE: 18 BRPM | HEIGHT: 66 IN | DIASTOLIC BLOOD PRESSURE: 66 MMHG | HEART RATE: 84 BPM | SYSTOLIC BLOOD PRESSURE: 149 MMHG | BODY MASS INDEX: 46.77 KG/M2 | TEMPERATURE: 98.3 F | OXYGEN SATURATION: 99 %

## 2018-06-13 VITALS
HEIGHT: 66 IN | SYSTOLIC BLOOD PRESSURE: 147 MMHG | HEART RATE: 112 BPM | WEIGHT: 290 LBS | RESPIRATION RATE: 18 BRPM | OXYGEN SATURATION: 97 % | TEMPERATURE: 98.3 F | BODY MASS INDEX: 46.61 KG/M2 | DIASTOLIC BLOOD PRESSURE: 76 MMHG

## 2018-06-13 DIAGNOSIS — J45.909 ASTHMA: Primary | ICD-10-CM

## 2018-06-13 DIAGNOSIS — J18.9 PNEUMONIA: ICD-10-CM

## 2018-06-13 DIAGNOSIS — E87.6 HYPOKALEMIA: ICD-10-CM

## 2018-06-13 DIAGNOSIS — J45.901 ASTHMA EXACERBATION: Primary | ICD-10-CM

## 2018-06-13 DIAGNOSIS — R06.00 DYSPNEA: ICD-10-CM

## 2018-06-13 LAB
ANION GAP SERPL CALCULATED.3IONS-SCNC: 7 MMOL/L (ref 4–13)
ATRIAL RATE: 93 BPM
BASOPHILS # BLD AUTO: 0.02 THOUSANDS/ΜL (ref 0–0.1)
BASOPHILS NFR BLD AUTO: 0 % (ref 0–1)
BUN SERPL-MCNC: 16 MG/DL (ref 5–25)
CALCIUM SERPL-MCNC: 9.1 MG/DL (ref 8.3–10.1)
CHLORIDE SERPL-SCNC: 103 MMOL/L (ref 100–108)
CO2 SERPL-SCNC: 28 MMOL/L (ref 21–32)
CREAT SERPL-MCNC: 0.84 MG/DL (ref 0.6–1.3)
DEPRECATED D DIMER PPP: 577 NG/ML (FEU) (ref 0–424)
EOSINOPHIL # BLD AUTO: 0.26 THOUSAND/ΜL (ref 0–0.61)
EOSINOPHIL NFR BLD AUTO: 2 % (ref 0–6)
ERYTHROCYTE [DISTWIDTH] IN BLOOD BY AUTOMATED COUNT: 11.8 % (ref 11.6–15.1)
EXT PREG TEST URINE: NEGATIVE
GFR SERPL CREATININE-BSD FRML MDRD: 113 ML/MIN/1.73SQ M
GLUCOSE SERPL-MCNC: 112 MG/DL (ref 65–140)
HCG SERPL QL: NEGATIVE
HCT VFR BLD AUTO: 35.2 % (ref 34.8–46.1)
HGB BLD-MCNC: 11.9 G/DL (ref 11.5–15.4)
IMM GRANULOCYTES # BLD AUTO: 0.03 THOUSAND/UL (ref 0–0.2)
IMM GRANULOCYTES NFR BLD AUTO: 0 % (ref 0–2)
LYMPHOCYTES # BLD AUTO: 2.73 THOUSANDS/ΜL (ref 0.6–4.47)
LYMPHOCYTES NFR BLD AUTO: 25 % (ref 14–44)
MCH RBC QN AUTO: 31.2 PG (ref 26.8–34.3)
MCHC RBC AUTO-ENTMCNC: 33.8 G/DL (ref 31.4–37.4)
MCV RBC AUTO: 92 FL (ref 82–98)
MONOCYTES # BLD AUTO: 0.52 THOUSAND/ΜL (ref 0.17–1.22)
MONOCYTES NFR BLD AUTO: 5 % (ref 4–12)
NEUTROPHILS # BLD AUTO: 7.26 THOUSANDS/ΜL (ref 1.85–7.62)
NEUTS SEG NFR BLD AUTO: 68 % (ref 43–75)
NRBC BLD AUTO-RTO: 0 /100 WBCS
P AXIS: 45 DEGREES
PLATELET # BLD AUTO: 232 THOUSANDS/UL (ref 149–390)
PMV BLD AUTO: 9.2 FL (ref 8.9–12.7)
POTASSIUM SERPL-SCNC: 3 MMOL/L (ref 3.5–5.3)
PR INTERVAL: 172 MS
QRS AXIS: 27 DEGREES
QRSD INTERVAL: 86 MS
QT INTERVAL: 372 MS
QTC INTERVAL: 462 MS
RBC # BLD AUTO: 3.81 MILLION/UL (ref 3.81–5.12)
SODIUM SERPL-SCNC: 138 MMOL/L (ref 136–145)
T WAVE AXIS: 33 DEGREES
TROPONIN I SERPL-MCNC: <0.02 NG/ML
VENTRICULAR RATE: 93 BPM
WBC # BLD AUTO: 10.82 THOUSAND/UL (ref 4.31–10.16)

## 2018-06-13 PROCEDURE — 93005 ELECTROCARDIOGRAM TRACING: CPT

## 2018-06-13 PROCEDURE — 84484 ASSAY OF TROPONIN QUANT: CPT | Performed by: EMERGENCY MEDICINE

## 2018-06-13 PROCEDURE — 93010 ELECTROCARDIOGRAM REPORT: CPT | Performed by: INTERNAL MEDICINE

## 2018-06-13 PROCEDURE — 81025 URINE PREGNANCY TEST: CPT | Performed by: EMERGENCY MEDICINE

## 2018-06-13 PROCEDURE — 94640 AIRWAY INHALATION TREATMENT: CPT

## 2018-06-13 PROCEDURE — 71275 CT ANGIOGRAPHY CHEST: CPT

## 2018-06-13 PROCEDURE — 36415 COLL VENOUS BLD VENIPUNCTURE: CPT | Performed by: EMERGENCY MEDICINE

## 2018-06-13 PROCEDURE — 99284 EMERGENCY DEPT VISIT MOD MDM: CPT

## 2018-06-13 PROCEDURE — 94760 N-INVAS EAR/PLS OXIMETRY 1: CPT

## 2018-06-13 PROCEDURE — 99283 EMERGENCY DEPT VISIT LOW MDM: CPT

## 2018-06-13 PROCEDURE — 85379 FIBRIN DEGRADATION QUANT: CPT | Performed by: EMERGENCY MEDICINE

## 2018-06-13 PROCEDURE — 71046 X-RAY EXAM CHEST 2 VIEWS: CPT

## 2018-06-13 PROCEDURE — 85025 COMPLETE CBC W/AUTO DIFF WBC: CPT | Performed by: EMERGENCY MEDICINE

## 2018-06-13 PROCEDURE — 84703 CHORIONIC GONADOTROPIN ASSAY: CPT | Performed by: EMERGENCY MEDICINE

## 2018-06-13 PROCEDURE — 80048 BASIC METABOLIC PNL TOTAL CA: CPT | Performed by: EMERGENCY MEDICINE

## 2018-06-13 RX ORDER — ALBUTEROL SULFATE 90 UG/1
2 AEROSOL, METERED RESPIRATORY (INHALATION) EVERY 4 HOURS PRN
Qty: 1 INHALER | Refills: 0 | Status: SHIPPED | OUTPATIENT
Start: 2018-06-13

## 2018-06-13 RX ORDER — IPRATROPIUM BROMIDE AND ALBUTEROL SULFATE 2.5; .5 MG/3ML; MG/3ML
SOLUTION RESPIRATORY (INHALATION)
Status: COMPLETED
Start: 2018-06-13 | End: 2018-06-13

## 2018-06-13 RX ORDER — IPRATROPIUM BROMIDE AND ALBUTEROL SULFATE 2.5; .5 MG/3ML; MG/3ML
3 SOLUTION RESPIRATORY (INHALATION)
Status: DISCONTINUED | OUTPATIENT
Start: 2018-06-13 | End: 2018-06-13 | Stop reason: HOSPADM

## 2018-06-13 RX ORDER — ALBUTEROL SULFATE 2.5 MG/3ML
5 SOLUTION RESPIRATORY (INHALATION) ONCE
Status: COMPLETED | OUTPATIENT
Start: 2018-06-13 | End: 2018-06-13

## 2018-06-13 RX ORDER — PREDNISONE 20 MG/1
60 TABLET ORAL ONCE
Status: COMPLETED | OUTPATIENT
Start: 2018-06-13 | End: 2018-06-13

## 2018-06-13 RX ORDER — AZITHROMYCIN 250 MG/1
250 TABLET, FILM COATED ORAL DAILY
Qty: 4 TABLET | Refills: 0 | Status: SHIPPED | OUTPATIENT
Start: 2018-06-13 | End: 2018-06-17

## 2018-06-13 RX ORDER — PREDNISONE 20 MG/1
60 TABLET ORAL DAILY
Qty: 12 TABLET | Refills: 0 | Status: SHIPPED | OUTPATIENT
Start: 2018-06-13 | End: 2018-06-17

## 2018-06-13 RX ORDER — SODIUM CHLORIDE FOR INHALATION 0.9 %
3 VIAL, NEBULIZER (ML) INHALATION ONCE
Status: COMPLETED | OUTPATIENT
Start: 2018-06-13 | End: 2018-06-13

## 2018-06-13 RX ORDER — POTASSIUM CHLORIDE 20 MEQ/1
40 TABLET, EXTENDED RELEASE ORAL ONCE
Status: COMPLETED | OUTPATIENT
Start: 2018-06-13 | End: 2018-06-13

## 2018-06-13 RX ORDER — AZITHROMYCIN 250 MG/1
500 TABLET, FILM COATED ORAL ONCE
Status: COMPLETED | OUTPATIENT
Start: 2018-06-13 | End: 2018-06-13

## 2018-06-13 RX ADMIN — IPRATROPIUM BROMIDE AND ALBUTEROL SULFATE 3 ML: .5; 3 SOLUTION RESPIRATORY (INHALATION) at 17:02

## 2018-06-13 RX ADMIN — SODIUM CHLORIDE 1000 ML: 0.9 INJECTION, SOLUTION INTRAVENOUS at 07:54

## 2018-06-13 RX ADMIN — ISODIUM CHLORIDE 3 ML: 0.03 SOLUTION RESPIRATORY (INHALATION) at 06:12

## 2018-06-13 RX ADMIN — IPRATROPIUM BROMIDE 0.5 MG: 0.5 SOLUTION RESPIRATORY (INHALATION) at 10:38

## 2018-06-13 RX ADMIN — ALBUTEROL SULFATE 5 MG: 2.5 SOLUTION RESPIRATORY (INHALATION) at 10:38

## 2018-06-13 RX ADMIN — PREDNISONE 60 MG: 20 TABLET ORAL at 06:09

## 2018-06-13 RX ADMIN — ALBUTEROL SULFATE 10 MG: 2.5 SOLUTION RESPIRATORY (INHALATION) at 06:12

## 2018-06-13 RX ADMIN — AZITHROMYCIN 500 MG: 250 TABLET, FILM COATED ORAL at 06:09

## 2018-06-13 RX ADMIN — IPRATROPIUM BROMIDE AND ALBUTEROL SULFATE 3 ML: 2.5; .5 SOLUTION RESPIRATORY (INHALATION) at 17:02

## 2018-06-13 RX ADMIN — IOHEXOL 170 ML: 350 INJECTION, SOLUTION INTRAVENOUS at 07:42

## 2018-06-13 RX ADMIN — IPRATROPIUM BROMIDE 1 MG: 0.5 SOLUTION RESPIRATORY (INHALATION) at 06:12

## 2018-06-13 RX ADMIN — POTASSIUM CHLORIDE 40 MEQ: 1500 TABLET, EXTENDED RELEASE ORAL at 05:44

## 2018-06-13 NOTE — DISCHARGE INSTRUCTIONS
Asthma, Ambulatory Care   GENERAL INFORMATION:   Asthma  is a lung disease that makes breathing difficult  Chronic inflammation and reactions to triggers narrow the airways in your lungs  Asthma can become life-threatening if it is not managed  Common symptoms include the following:   · Coughing     · Wheezing     · Shortness of breath     · Chest tightness  Seek immediate care for the following symptoms:   · Severe shortness of breath    · Blue or gray lips or nails    · Skin around your neck and ribs pulls in with each breath    · Shortness of breath, even after you take your short-term medicine as directed     · Peak flow numbers in the red zone of your asthma action plan  Treatment for asthma  will depend on how severe it is  Medicine may decrease inflammation, open airways, and make it easier to breathe  Medicines may be inhaled, taken as a pill, or injected  Short-term medicines relieve your symptoms quickly  Long-term medicines are used to prevent future attacks  You may also need medicine to help control your allergies  Manage and prevent future asthma attacks:   · Follow your asthma action pan  This is a written plan that you and your healthcare provider create  It explains which medicine you need and when to change doses if necessary  It also explains how you can monitor symptoms and use a peak flow meter  The meter measures how well your lungs are working  · Manage other health conditions , such as allergies, acid reflux, and sleep apnea  · Identify and avoid triggers  These may include pets, dust mites, mold, and cockroaches  · Do not smoke and avoid others who smoke  If you smoke, it is never too late to quit  Ask your healthcare provider if you need help quitting  · Ask about a flu vaccine  The flu can make your asthma worse  You may need a yearly flu shot  Follow up with your healthcare provider as directed:   You will need to return to make sure your medicine is working and your symptoms are controlled  You may be referred to an asthma or allergy specialist  Maci Espinoza may be asked to keep a record of your peak flow values and bring it with you to your appointments  Write down your questions so you remember to ask them during your visits  CARE AGREEMENT:   You have the right to help plan your care  Learn about your health condition and how it may be treated  Discuss treatment options with your caregivers to decide what care you want to receive  You always have the right to refuse treatment  The above information is an  only  It is not intended as medical advice for individual conditions or treatments  Talk to your doctor, nurse or pharmacist before following any medical regimen to see if it is safe and effective for you  © 2014 [address] is for End User's use only and may not be sold, redistributed or otherwise used for commercial purposes  All illustrations and images included in CareNotes® are the copyrighted property of A D A M [employer] Inc  or Tejas Mendez

## 2018-06-13 NOTE — ED PROVIDER NOTES
History  Chief Complaint   Patient presents with    Asthma     Patient brought in by EMS for cough, shortness of breath asthma exacebation since Friday  Patient duoneb on way, reports relief  History of Present Illness   24 y o  female presenting for evaluation of 4 days initially nasal congestion and fatigue that progressed to cough with dyspnea over the past 2 days  Patient's notes attempted treatment with albuterol which modestly improved symptoms  ROS: Patient denies facial pain, fever/chills, dyspnea, otalgia, headache, malaise, chest pain, wheezing, night sweats, unintentional weight loss, hemoptysis, or nausea/vomiting  No post-tussive emesis  Patient eating less but tolerating liquids  Patient denies any history of immunocompromised state  Patient has a remote history of asthma as a child without recent treatment  Patient notes similar episodes, last when she was approximately 14years old  She has never been hospitalized for asthma  Patient denies any immobilization of at least 3 days or surgery in the past 4 weeks  Patient denies any history of DVT or PE  Patient denies any malignancy with treatment within the past 6 months  Patient does use a an implanted device containing estrogen  PHYSICAL EXAM:   Constitutional: No acute distress   Eyes: No conjunctival injection or erythema  No discharge  HENT: Pharynx moist without erythema or exudate  Neck: No stridor  No use of accessory muscles  No rigidity with normal range of motion and no meningeal signs  CV: Regular rate and rhythm  No murmur  Respiratory: Lungs with left sided wheezing only on expiration, none on the right  Abdomen: Soft, non-tender, non-distended  Back: No vertebral tenderness  Skin: Normal color  Warm and dry  Extremities: Non-tender  No lower extremity edema  Neuro: Alert with appropriate answers to questions  No gross motor deficits       Medical Decision Making   The patient presents with multiple symptoms with a broad differential but most consistent with acute respiratory infection  Patient does have lung sounds the could be related to lower respiratory infection, as such will obtain x-ray to evaluate for this potential     Patient does have IUD, otherwise no PE risk factors  Will send ddimer as low risk Well's criteria  Discussed symptomatic care in detail with the patient  Advised to continue ibuprofen and acetaminophen  Patient is to followup with primary care physician with continuing symptoms in the next 3-5 days  Patient advised to return to the ER with change or worsening of symptoms, including change in mental status, uncontrolled fever, inability to tolerate liquids, dehydration, respiratory distress or other concerns  None       Past Medical History:   Diagnosis Date    Asthma     Mediastinal emphysema (pneumomediastinum) (HCC)     Rhabdomyolysis        History reviewed  No pertinent surgical history  History reviewed  No pertinent family history  I have reviewed and agree with the history as documented  Social History   Substance Use Topics    Smoking status: Never Smoker    Smokeless tobacco: Never Used    Alcohol use Yes      Comment: socially        Review of Systems   All other systems reviewed and are negative        Physical Exam  Physical Exam    Vital Signs  ED Triage Vitals [06/13/18 0422]   Temperature Pulse Respirations Blood Pressure SpO2   98 3 °F (36 8 °C) 97 18 138/84 99 %      Temp Source Heart Rate Source Patient Position - Orthostatic VS BP Location FiO2 (%)   Oral Monitor Sitting Left arm --      Pain Score       No Pain           Vitals:    06/13/18 0630 06/13/18 0700 06/13/18 0915 06/13/18 1143   BP: 116/59 117/58 115/60 147/76   Pulse: 92 (!) 107 96 (!) 112   Patient Position - Orthostatic VS:    Sitting       Visual Acuity      ED Medications  Medications   potassium chloride (K-DUR,KLOR-CON) CR tablet 40 mEq (40 mEq Oral Given 6/13/18 0544)   azithromycin (ZITHROMAX) tablet 500 mg (500 mg Oral Given 6/13/18 0609)   predniSONE tablet 60 mg (60 mg Oral Given 6/13/18 0609)   albuterol inhalation solution 10 mg (10 mg Nebulization Given 6/13/18 0612)     And   ipratropium (ATROVENT) 0 02 % inhalation solution 1 mg (1 mg Nebulization Given 6/13/18 0612)     And   sodium chloride 0 9 % inhalation solution 3 mL (3 mL Nebulization Given 6/13/18 0612)   iohexol (OMNIPAQUE) 350 MG/ML injection (MULTI-DOSE) 170 mL (170 mL Intravenous Given 6/13/18 0742)   sodium chloride 0 9 % bolus 1,000 mL (0 mL Intravenous Stopped 6/13/18 0854)   albuterol inhalation solution 5 mg (5 mg Nebulization Given 6/13/18 1038)   ipratropium (ATROVENT) 0 02 % inhalation solution 0 5 mg (0 5 mg Nebulization Given 6/13/18 1038)       Diagnostic Studies  Results Reviewed     Procedure Component Value Units Date/Time    hCG, qualitative pregnancy [[id number]]  (Normal) Collected:  06/13/18 0449    Lab Status:  Final result Specimen:  Blood from Arm, Right Updated:  06/13/18 0744     Preg, Serum Negative    POCT pregnancy, urine [[id number]]  (Normal) Resulted:  06/13/18 0730    Lab Status:  Final result Updated:  06/13/18 0730     EXT PREG TEST UR (Ref: Negative) negative    D-dimer, quantitative [[id number]]  (Abnormal) Collected:  06/13/18 0646    Lab Status:  Final result Specimen:  Blood Updated:  06/13/18 0709     D-Dimer, Quant 577 (H) ng/ml (FEU)     Troponin I [[id number]]  (Normal) Collected:  06/13/18 0449    Lab Status:  Final result Specimen:  Blood from Arm, Right Updated:  06/13/18 0522     Troponin I <0 02 ng/mL     Narrative:         [employer] Chemistry analyzer 99% cutoff is > 0 04 ng/mL in network labs    o cTnI 99% cutoff is useful only when applied to patients in the clinical setting of myocardial ischemia  o cTnI 99% cutoff should be interpreted in the context of clinical history, ECG findings and possibly cardiac imaging to establish correct diagnosis    o cTnI 99% cutoff may be suggestive but clearly not indicative of a coronary event without the clinical setting of myocardial ischemia  Basic metabolic panel [[id number]]  (Abnormal) Collected:  06/13/18 0449    Lab Status:  Final result Specimen:  Blood from Arm, Right Updated:  06/13/18 0514     Sodium 138 mmol/L      Potassium 3 0 (L) mmol/L      Chloride 103 mmol/L      CO2 28 mmol/L      Anion Gap 7 mmol/L      BUN 16 mg/dL      Creatinine 0 84 mg/dL      Glucose 112 mg/dL      Calcium 9 1 mg/dL      eGFR 113 ml/min/1 73sq m     Narrative:         National Kidney Disease Education Program recommendations are as follows:  GFR calculation is accurate only with a steady state creatinine  Chronic Kidney disease less than 60 ml/min/1 73 sq  meters  Kidney failure less than 15 ml/min/1 73 sq  meters  CBC and differential [[id number]]  (Abnormal) Collected:  06/13/18 0449    Lab Status:  Final result Specimen:  Blood from Arm, Right Updated:  06/13/18 0503     WBC 10 82 (H) Thousand/uL      RBC 3 81 Million/uL      Hemoglobin 11 9 g/dL      Hematocrit 35 2 %      MCV 92 fL      MCH 31 2 pg      MCHC 33 8 g/dL      RDW 11 8 %      MPV 9 2 fL      Platelets 285 Thousands/uL      nRBC 0 /100 WBCs      Neutrophils Relative 68 %      Immat GRANS % 0 %      Lymphocytes Relative 25 %      Monocytes Relative 5 %      Eosinophils Relative 2 %      Basophils Relative 0 %      Neutrophils Absolute 7 26 Thousands/µL      Immature Grans Absolute 0 03 Thousand/uL      Lymphocytes Absolute 2 73 Thousands/µL      Monocytes Absolute 0 52 Thousand/µL      Eosinophils Absolute 0 26 Thousand/µL      Basophils Absolute 0 02 Thousands/µL                  CTA ED chest PE study   ED Interpretation by Mee Nieves DO (06/13 2065)   No pulmonary embolism        Mild groundglass densities scattered in the right lung, suggestive of infectious/inflammatory process  Final Result by Coco Leone MD (06/13 8091)      No pulmonary embolism  Mild groundglass densities scattered in the right lung, suggestive of infectious/inflammatory process  Workstation performed: [id number]         XR chest 2 views   ED Interpretation by Emerald Verduzco DO (06/13 5210)   No acute cardiopulmonary disease  Final Result by Gerard Gonzalez MD (06/13 5905)      No acute cardiopulmonary disease  Workstation performed: [id number]                    Procedures  Procedures       Phone Contacts  ED Phone Contact    ED Course  ED Course as of Jun 18 0327 Wed Jun 13, 2018   9348 EKG demonstrates normal sinus rhythm with no acute ST segment changes  MDM  CritCare Time    Disposition  Final diagnoses:   Asthma exacerbation   Pneumonia   Dyspnea   Hypokalemia     Time reflects when diagnosis was documented in both MDM as applicable and the Disposition within this note     Time User Action Codes Description Comment    6/13/2018  6:37 AM Tenisha Finder Add [J45 901] Asthma exacerbation     6/13/2018  6:37 AM Tenisha Finder Add [J18 9] Pneumonia     6/13/2018  6:37 AM Tenisha Finder Add [R06 00] Dyspnea     6/13/2018  6:40 AM Tenisha Finder Add [E87 6] Hypokalemia       ED Disposition     ED Disposition Condition Comment    Discharge  Agnes Dotter discharge to home/self care  Condition at discharge: Good        Follow-up Information     Follow up With Specialties Details Why Contact Info    Primary Care Physician  Schedule an appointment as soon as possible for a visit in 3 days Follow up and reassessment  Discussion of PFTs  Recheck potassium             Discharge Medication List as of 6/13/2018  6:48 AM      START taking these medications    Details   albuterol (PROVENTIL HFA,VENTOLIN HFA) 90 mcg/act inhaler Inhale 2 puffs every 4 (four) hours as needed for wheezing, Starting Wed 6/13/2018, Print      azithromycin (ZITHROMAX) 250 mg tablet Take 1 tablet (250 mg total) by mouth daily for 4 days, Starting Wed 6/13/2018, Until Sun 6/17/2018, Print      predniSONE 20 mg tablet Take 3 tablets (60 mg total) by mouth daily for 4 days, Starting Wed 6/13/2018, Until Sun 6/17/2018, Print           No discharge procedures on file      ED Provider  Electronically Signed by           Brian Hurt MD  06/18/18 [address] Parish Varela MD  06/18/18 [address] Parihs Varela MD  06/18/18 8050

## 2018-06-13 NOTE — DISCHARGE INSTRUCTIONS
Bronchospasm   WHAT YOU NEED TO KNOW:   Bronchospasm is a narrowing of the airway that usually comes and goes  You may be at risk for bronchospasm if you have a chest cold or allergies  You may also be at risk if you are bothered by air pollution, certain medicines, cold, dry air, smoke, or strong odors  Exercise may worsen your symptoms  Bronchospasms may make it hard for you to breathe  DISCHARGE INSTRUCTIONS:   Medicines: You may need any of the following:  · Bronchodilators  help expand your airway for easier breathing  Some of these medicines may help prevent future spasms  · Inhaled steroids  help reduce swelling in your airway and soothe your breathing  These are used for long-term control  · Anticholinergics  help relax and open your airway  · Take your medicine as directed  Contact your healthcare provider if you think your medicine is not helping or if you have side effects  Tell him of her if you are allergic to any medicine  Keep a list of the medicines, vitamins, and herbs you take  Include the amounts, and when and why you take them  Bring the list or the pill bottles to follow-up visits  Carry your medicine list with you in case of an emergency  Follow up with your healthcare provider as directed: You may need more tests to find the cause of your condition  Write down your questions so you remember to ask them during your visits  Self-care:   · Avoid triggers  · Warm up before you exercise  Ask your healthcare provider about the best exercise plan for you  · Try to avoid people who are sick  Ask your healthcare provider if you need a flu or pneumonia vaccine  · Breathe through your nose when you are in cold, dry air or weather  This may help reduce lung irritation by warming the air before it reaches your lungs  Contact your healthcare provider if:   · You have a fever  · You have a cough that will not go away  · Your wheezing worsens      · You have questions or concerns about your condition or care  Return to the emergency department if:   · You cough or spit up blood  · You have trouble breathing  · You have blue fingernails or toenails  · You have chest pain  · You have a fast or uneven heartbeat  © 2017 [employer] Information is for End User's use only and may not be sold, redistributed or otherwise used for commercial purposes  All illustrations and images included in CareNotes® are the copyrighted property of [employer]  or [employer]  The above information is an  only  It is not intended as medical advice for individual conditions or treatments  Talk to your doctor, nurse or pharmacist before following any medical regimen to see if it is safe and effective for you  Community Acquired Pneumonia   WHAT YOU NEED TO KNOW:   Community-acquired pneumonia (CAP) is a lung infection that you get outside of a hospital or nursing home setting  Your lungs become inflamed and cannot work well  CAP may be caused by bacteria, viruses, or fungi  DISCHARGE INSTRUCTIONS:   Return to the emergency department if:   · You are confused and cannot think clearly  · You have increased trouble breathing  · Your lips or fingernails turn gray or blue  Contact your healthcare provider if:   · Your symptoms do not get better, or they get worse  · You are urinating less, or not at all  · You have questions or concerns about your condition or care  Medicines:   · Medicines  may be given to treat a bacterial, viral, or fungal infection  You may also be given medicines to dilate your bronchial tubes to help you breathe more easily  · Take your medicine as directed  Contact your healthcare provider if you think your medicine is not helping or if you have side effects  Tell him or her if you are allergic to any medicine  Keep a list of the medicines, vitamins, and herbs you take   Include the amounts, and when and why you take them  Bring the list or the pill bottles to follow-up visits  Carry your medicine list with you in case of an emergency  Follow up with your healthcare provider within 3 days or as directed: You may need another x-ray  Write down your questions so you remember to ask them during your visits  Deep breathing and coughing:  Deep breathing helps open the air passages in your lungs  Coughing helps bring up mucus from your lungs  Take a deep breath and hold the breath as long as you can  Then push the air out of your lungs with a deep, strong cough  Spit out any mucus you have coughed up  Take 10 deep breaths in a row every hour that you are awake  Remember to follow each deep breath with a cough  Do not smoke or allow others to smoke around you:  Nicotine and other chemicals in cigarettes and cigars can cause lung damage  Ask your healthcare provider for information if you currently smoke and need help to quit  E-cigarettes or smokeless tobacco still contain nicotine  Talk to your healthcare provider before you use these products  Manage CAP at home:   · Breathe warm, moist air  This helps loosen mucus  Loosely place a warm, wet washcloth over your nose and mouth  A room humidifier may also help make the air moist     · Drink liquids as directed  Ask your healthcare provider how much liquid to drink each day and which liquids to drink  Liquids help make mucus thin and easier to get out of your body  · Gently tap your chest   This helps loosen mucus so it is easier to cough  Lie with your head lower than your chest several times a day and tap your chest      · Get plenty of rest   Rest helps your body heal   Prevent CAP:   · Wash your hands often with soap and water  Carry germ-killing hand gel with you  You can use the gel to clean your hands when soap and water are not available   Do not touch your eyes, nose, or mouth unless you have washed your hands first      · Clean surfaces often  Clean doorknobs, countertops, cell phones, and other surfaces that are touched often  · Always cover your mouth when you cough  Cough into a tissue or your shirtsleeve so you do not spread germs from your hands  · Try to avoid people who have a cold or the flu  If you are sick, stay away from others as much as possible  · Ask about vaccines  You may need a vaccine to help prevent pneumonia  Get an influenza (flu) vaccine every year as soon as it becomes available  © 2017 [employer]  Information is for End User's use only and may not be sold, redistributed or otherwise used for commercial purposes  All illustrations and images included in CareNotes® are the copyrighted property of [employer]  or [employer]  The above information is an  only  It is not intended as medical advice for individual conditions or treatments  Talk to your doctor, nurse or pharmacist before following any medical regimen to see if it is safe and effective for you  Hypokalemia   WHAT YOU NEED TO KNOW:   Hypokalemia is a low level of potassium in your blood  Potassium helps control how your muscles, heart, and digestive system work  Hypokalemia occurs when your body loses too much potassium or does not absorb enough from food  DISCHARGE INSTRUCTIONS:   Return to the emergency department if:   · You cannot move your arm or leg  · You have a fast or irregular heartbeat  · You are too tired or weak to stand up  Contact your healthcare provider if:   · You are vomiting, or you have diarrhea  · You have numbness or tingling in your arms or legs  · Your symptoms do not go away or they get worse  · You have questions or concerns about your condition or care  Medicines:   · Potassium  will be given to bring your potassium levels back to normal     · Take your medicine as directed    Contact your healthcare provider if you think your medicine is not helping or if you have side effects  Tell him of her if you are allergic to any medicine  Keep a list of the medicines, vitamins, and herbs you take  Include the amounts, and when and why you take them  Bring the list or the pill bottles to follow-up visits  Carry your medicine list with you in case of an emergency  Eat foods that are high in potassium:  Foods that are high in potassium include bananas, oranges, tomatoes, potatoes, and avocado  Dean beans, turkey, salmon, lean beef, yogurt, and milk are also high in potassium  Ask your healthcare provider or dietitian for more information about foods that are high in potassium  Follow up with your healthcare provider as directed:  Write down your questions so you remember to ask them during your visits  © 2017 [employer] Information is for End User's use only and may not be sold, redistributed or otherwise used for commercial purposes  All illustrations and images included in CareNotes® are the copyrighted property of [employer]  or [employer]  The above information is an  only  It is not intended as medical advice for individual conditions or treatments  Talk to your doctor, nurse or pharmacist before following any medical regimen to see if it is safe and effective for you

## 2019-10-10 NOTE — H&P ADULT. - NEGATIVE RESPIRATORY AND THORAX SYMPTOMS
alert leigh speaking refused   daughter in law will translate  c/o dominique eye irritation x 3 days  L>R  left eye is red c/o slight blurry vision no eye drainage noted   hx DM  chronic lung disease
no cough/no wheezing/no pleuritic chest pain

## 2020-05-29 ENCOUNTER — APPOINTMENT (OUTPATIENT)
Dept: PULMONOLOGY | Facility: CLINIC | Age: 25
End: 2020-05-29
Payer: COMMERCIAL

## 2020-05-29 VITALS — HEART RATE: 83 BPM | SYSTOLIC BLOOD PRESSURE: 102 MMHG | DIASTOLIC BLOOD PRESSURE: 60 MMHG | OXYGEN SATURATION: 98 %

## 2020-05-29 VITALS — BODY MASS INDEX: 47.09 KG/M2 | HEIGHT: 66 IN | WEIGHT: 293 LBS

## 2020-05-29 DIAGNOSIS — K21.9 GASTRO-ESOPHAGEAL REFLUX DISEASE W/OUT ESOPHAGITIS: ICD-10-CM

## 2020-05-29 DIAGNOSIS — J45.30 MILD PERSISTENT ASTHMA, UNCOMPLICATED: ICD-10-CM

## 2020-05-29 DIAGNOSIS — R06.00 DYSPNEA, UNSPECIFIED: ICD-10-CM

## 2020-05-29 DIAGNOSIS — E66.9 OBESITY, UNSPECIFIED: ICD-10-CM

## 2020-05-29 DIAGNOSIS — Z20.828 CONTACT WITH AND (SUSPECTED) EXPOSURE TO OTHER VIRAL COMMUNICABLE DISEASES: ICD-10-CM

## 2020-05-29 PROCEDURE — 99204 OFFICE O/P NEW MOD 45 MIN: CPT

## 2020-05-29 RX ORDER — METHYLPREDNISOLONE 4 MG/1
4 TABLET ORAL
Qty: 1 | Refills: 1 | Status: ACTIVE | COMMUNITY
Start: 2020-05-29 | End: 1900-01-01

## 2020-05-29 RX ORDER — FLUTICASONE FUROATE AND VILANTEROL TRIFENATATE 200; 25 UG/1; UG/1
200-25 POWDER RESPIRATORY (INHALATION) DAILY
Qty: 1 | Refills: 5 | Status: ACTIVE | COMMUNITY
Start: 2020-05-29 | End: 1900-01-01

## 2020-05-29 NOTE — PHYSICAL EXAM
[No Acute Distress] : no acute distress [Low Lying Soft Palate] : low lying soft palate [Normal Appearance] : normal appearance [Enlarged Base of the Tongue] : enlarged base of the tongue [No Neck Mass] : no neck mass [Neck Circumference: ___] : neck circumference: [unfilled] [Normal S1, S2] : normal s1, s2 [Normal Rate/Rhythm] : normal rate/rhythm [No Murmurs] : no murmurs [No Resp Distress] : no resp distress [Clear to Auscultation Bilaterally] : clear to auscultation bilaterally [No Abnormalities] : no abnormalities [Normal Gait] : normal gait [Benign] : benign [No Clubbing] : no clubbing [Normal Color/ Pigmentation] : normal color/ pigmentation [No Focal Deficits] : no focal deficits [No Edema] : no edema [Normal Affect] : normal affect [Oriented x3] : oriented x3 [TextBox_2] : obese

## 2020-05-29 NOTE — CONSULT LETTER
[Dear  ___] : Dear  [unfilled], [Consult Letter:] : I had the pleasure of evaluating your patient, [unfilled]. [Please see my note below.] : Please see my note below. [Consult Closing:] : Thank you very much for allowing me to participate in the care of this patient.  If you have any questions, please do not hesitate to contact me. [Sincerely,] : Sincerely, [DrFaraz  ___] : Dr. HUMPHRIES

## 2020-05-29 NOTE — COUNSELING
[Benefits of weight loss discussed] : Benefits of weight loss discussed [Potential consequences of obesity discussed] : Potential consequences of obesity discussed [Encouraged to maintain food diary] : Encouraged to maintain food diary [Encouraged to increase physical activity] : Encouraged to increase physical activity [Structured Weight Management Program suggested:] : Structured weight management program suggested [Encouraged to use exercise tracking device] : Encouraged to use exercise tracking device

## 2020-05-29 NOTE — HISTORY OF PRESENT ILLNESS
[TextBox_4] : Clinical COVID with anosmia in March, 2020\par COVID IgG pos\par Multiple family members with suspect COVID in March\par Recent cough and exertional dyspnea\par No cp, fever, sputum\par Exertional asthma as a child\par Better with montelukast and azithromycin from Dr Rodrigues on 5/28/20

## 2020-06-30 ENCOUNTER — APPOINTMENT (OUTPATIENT)
Dept: PULMONOLOGY | Facility: CLINIC | Age: 25
End: 2020-06-30

## 2021-01-06 NOTE — ED ADULT NURSE NOTE - IS THE PATIENT ABLE TO BE SCREENED?
Domo dropped off his hearing aid on 1/6/2021; please see closed message from 1-4-2021.    Hearing aid was placed in Stephanie's tray in office.     Yes

## 2023-03-09 ENCOUNTER — HOSPITAL ENCOUNTER (OUTPATIENT)
Dept: LAB | Age: 28
Discharge: HOME | End: 2023-03-09

## 2023-03-09 DIAGNOSIS — G44.201 ACUTE INTRACTABLE TENSION-TYPE HEADACHE: ICD-10-CM

## 2023-03-09 DIAGNOSIS — Z13.9 SCREENING DUE: ICD-10-CM

## 2023-03-09 DIAGNOSIS — E66.01 OBESITY, MORBID, BMI 40.0-49.9 (CMD): ICD-10-CM

## 2023-03-09 DIAGNOSIS — E66.01 MORBID (SEVERE) OBESITY DUE TO EXCESS CALORIES (CMD): Primary | ICD-10-CM

## 2023-03-09 LAB
ALBUMIN SERPL-MCNC: 3.7 G/DL (ref 3.6–5.1)
ALBUMIN/GLOB SERPL: 1 {RATIO} (ref 1–2.4)
ALP SERPL-CCNC: 75 UNITS/L (ref 45–117)
ALT SERPL-CCNC: 12 UNITS/L
ANION GAP SERPL CALC-SCNC: 10 MMOL/L (ref 7–19)
AST SERPL-CCNC: 8 UNITS/L
BASOPHILS # BLD: 0 K/MCL (ref 0–0.3)
BASOPHILS NFR BLD: 0 %
BILIRUB SERPL-MCNC: 0.7 MG/DL (ref 0.2–1)
BUN SERPL-MCNC: 11 MG/DL (ref 6–20)
BUN/CREAT SERPL: 17 (ref 7–25)
CALCIUM SERPL-MCNC: 9 MG/DL (ref 8.4–10.2)
CHLORIDE SERPL-SCNC: 100 MMOL/L (ref 97–110)
CHOLEST SERPL-MCNC: 140 MG/DL
CHOLEST/HDLC SERPL: 2.7 {RATIO}
CO2 SERPL-SCNC: 31 MMOL/L (ref 21–32)
CREAT SERPL-MCNC: 0.63 MG/DL (ref 0.51–0.95)
DEPRECATED RDW RBC: 38 FL (ref 39–50)
EOSINOPHIL # BLD: 0.1 K/MCL (ref 0–0.5)
EOSINOPHIL NFR BLD: 1 %
ERYTHROCYTE [DISTWIDTH] IN BLOOD: 11.3 % (ref 11–15)
FASTING DURATION TIME PATIENT: 12 HOURS (ref 0–999)
FASTING DURATION TIME PATIENT: 12 HOURS (ref 0–999)
GFR SERPLBLD BASED ON 1.73 SQ M-ARVRAT: >90 ML/MIN
GLOBULIN SER-MCNC: 3.7 G/DL (ref 2–4)
GLUCOSE SERPL-MCNC: 83 MG/DL (ref 70–99)
HCT VFR BLD CALC: 35.9 % (ref 36–46.5)
HDLC SERPL-MCNC: 51 MG/DL
HGB BLD-MCNC: 12.2 G/DL (ref 12–15.5)
IMM GRANULOCYTES # BLD AUTO: 0 K/MCL (ref 0–0.2)
IMM GRANULOCYTES # BLD: 0 %
LDLC SERPL CALC-MCNC: 79 MG/DL
LYMPHOCYTES # BLD: 2.6 K/MCL (ref 1–4.8)
LYMPHOCYTES NFR BLD: 32 %
MCH RBC QN AUTO: 31.4 PG (ref 26–34)
MCHC RBC AUTO-ENTMCNC: 34 G/DL (ref 32–36.5)
MCV RBC AUTO: 92.3 FL (ref 78–100)
MONOCYTES # BLD: 0.4 K/MCL (ref 0.3–0.9)
MONOCYTES NFR BLD: 4 %
NEUTROPHILS # BLD: 5.2 K/MCL (ref 1.8–7.7)
NEUTROPHILS NFR BLD: 63 %
NONHDLC SERPL-MCNC: 89 MG/DL
NRBC BLD MANUAL-RTO: 0 /100 WBC
PLATELET # BLD AUTO: 241 K/MCL (ref 140–450)
POTASSIUM SERPL-SCNC: 3.8 MMOL/L (ref 3.4–5.1)
PROT SERPL-MCNC: 7.4 G/DL (ref 6.4–8.2)
RBC # BLD: 3.89 MIL/MCL (ref 4–5.2)
SODIUM SERPL-SCNC: 137 MMOL/L (ref 135–145)
T4 FREE SERPL-MCNC: 1.1 NG/DL (ref 0.8–1.5)
TRIGL SERPL-MCNC: 51 MG/DL
TSH SERPL-ACNC: 1.28 MCUNITS/ML (ref 0.35–5)
WBC # BLD: 8.3 K/MCL (ref 4.2–11)

## 2023-03-09 PROCEDURE — 85025 COMPLETE CBC W/AUTO DIFF WBC: CPT

## 2023-03-09 PROCEDURE — 80053 COMPREHEN METABOLIC PANEL: CPT

## 2023-03-09 PROCEDURE — 84443 ASSAY THYROID STIM HORMONE: CPT

## 2023-03-09 PROCEDURE — 84439 ASSAY OF FREE THYROXINE: CPT

## 2023-03-09 PROCEDURE — 82570 ASSAY OF URINE CREATININE: CPT

## 2023-03-09 PROCEDURE — 36415 COLL VENOUS BLD VENIPUNCTURE: CPT

## 2023-03-09 PROCEDURE — 86003 ALLG SPEC IGE CRUDE XTRC EA: CPT

## 2023-03-09 PROCEDURE — 82172 ASSAY OF APOLIPOPROTEIN: CPT

## 2023-03-09 PROCEDURE — 82652 VIT D 1 25-DIHYDROXY: CPT

## 2023-03-09 PROCEDURE — 80061 LIPID PANEL: CPT

## 2023-03-09 PROCEDURE — 10000083 MISCELLANEOUS TEST

## 2023-03-09 PROCEDURE — 83036 HEMOGLOBIN GLYCOSYLATED A1C: CPT

## 2023-03-10 LAB
CREAT UR-MCNC: 261 MG/DL
HBA1C MFR BLD: 4.5 % (ref 4.5–5.6)
MICROALBUMIN UR-MCNC: 1.6 MG/DL
MICROALBUMIN/CREAT UR: 6.1 MG/G

## 2023-03-11 LAB — APO B100 SERPL-MCNC: 60 MG/DL (ref 55–125)

## 2023-03-14 LAB
REF LAB TEST NAME: NORMAL
SERVICE CMNT-IMP: NORMAL
VITAMIN D2 SERPL-MCNC: 54 PG/ML (ref 19.9–79.3)

## 2023-11-10 NOTE — ED PROVIDER NOTES
History  Chief Complaint   Patient presents with    Asthma     Patient c/o asthma  Patient was seen here this morning for asthma and states"the wheezing is getting worst"  Cheo Mathew is a 24 y o  female w PMH asthma, mediastinal emphysema, rhabdo who presents for evaluation of sob  Pt w sob and chest tightness  Was just d/c from ED early this AM  Her family is at [facility]  She called for a ride but apparently her ride just came to pick her up now and she has been sitting in the waiting room all day  bc she has been in waiting room she has not had any scripts filled and now chest is tight again  She is not familiar w asthma, usually does not have flares, mostly just remote childhood hx  She didn't realize breathing tx only last for a few hrs  She was negative for PE earlier, prescribed abx for pna  Prior to Admission Medications   Prescriptions Last Dose Informant Patient Reported? Taking? albuterol (PROVENTIL HFA,VENTOLIN HFA) 90 mcg/act inhaler   No No   Sig: Inhale 2 puffs every 4 (four) hours as needed for wheezing   azithromycin (ZITHROMAX) 250 mg tablet   No No   Sig: Take 1 tablet (250 mg total) by mouth daily for 4 days   predniSONE 20 mg tablet   No No   Sig: Take 3 tablets (60 mg total) by mouth daily for 4 days      Facility-Administered Medications: None       Past Medical History:   Diagnosis Date    Asthma     Mediastinal emphysema (pneumomediastinum) (HCC)     Rhabdomyolysis        History reviewed  No pertinent surgical history  History reviewed  No pertinent family history  I have reviewed and agree with the history as documented  Social History   Substance Use Topics    Smoking status: Never Smoker    Smokeless tobacco: Never Used    Alcohol use Yes      Comment: socially        Review of Systems    Physical Exam  Physical Exam   Constitutional: She is oriented to person, place, and time  She appears well-developed and well-nourished  No distress     Nad, on Is increased from prior with her inhaler?  Is she having increased shortness of breath or cough? video chat on her phone    HENT:   Head: Normocephalic and atraumatic  Eyes: Pupils are equal, round, and reactive to light  Neck: Neck supple  No tracheal deviation present  Cardiovascular: Normal rate, regular rhythm and intact distal pulses  Exam reveals no gallop and no friction rub  No murmur heard  Pulmonary/Chest: Effort normal  No respiratory distress  She has no wheezes  She has no rales  Minimal wheezes given patient is moderately tight w diminished air movement and prolonged expiratory phase throughout lung fields   No distress or accessory muscle use   Talking and sitting up in stretched comfortably    Abdominal: Soft  Bowel sounds are normal  She exhibits no distension and no mass  There is no tenderness  There is no guarding  Musculoskeletal: She exhibits no edema or deformity  Neurological: She is alert and oriented to person, place, and time  Skin: Skin is warm and dry  She is not diaphoretic  Psychiatric: She has a normal mood and affect  Her behavior is normal    Nursing note and vitals reviewed  Vital Signs  ED Triage Vitals [06/13/18 1641]   Temperature Pulse Respirations Blood Pressure SpO2   98 3 °F (36 8 °C) 96 22 168/98 96 %      Temp Source Heart Rate Source Patient Position - Orthostatic VS BP Location FiO2 (%)   Oral Monitor Sitting Right arm --      Pain Score       --           Vitals:    06/13/18 1641   BP: 168/98   Pulse: 96   Patient Position - Orthostatic VS: Sitting       Visual Acuity      ED Medications  Medications - No data to display    Diagnostic Studies  Results Reviewed     None                 No orders to display              Procedures  Procedures       Phone Contacts  ED Phone Contact    ED Course  ED Course as of Jun 13 1746 Wed Jun 13, 2018   1725 Sounds much improved following breathing treatment                                   MDM  Number of Diagnoses or Management Options  Asthma:   Diagnosis management comments: DDX includes but not ltd to:   Asthma w acute exacerbation given underlying pna  Already here earlier today  Here bc she needs another breathing tx and was unable to make it to the pharmacy since her prior disposition  She lives in [place], accompanied here by father who reports they live 4hr away in [place], driving back tonight and he is concerned she will not make the 4hr drive home  Reassured father and pt we will give breathing tx here and I gave father a list of local pharmacies so they can get script filled promptly     Based on ED course:  Evaluated pt after tx and she sounds and feels much improved  Now moving air more proficiently  Her father went to a local [facility] pharmacy and pt says he already has gotten one med filled  She is calling him to pick her up now  Return parameters discussed  Pt requires f/u as an outpt  Pt expresses understanding w above treatment plan  All questions answered prior to d/c  Portions of the record may have been created with voice recognition software   Occasional wrong word or "sound a like" substitutions may have occurred due to the inherent limitations of voice recognition software   Read the chart carefully and recognize, using context, where substitutions have occurred  CritCare Time    Disposition  Final diagnoses:   None     ED Disposition     None      Follow-up Information    None         Patient's Medications   Discharge Prescriptions    No medications on file     No discharge procedures on file      ED Provider  Electronically Signed by           Arnoldo Cronin PA-C  06/14/18 5436

## 2025-01-30 ENCOUNTER — APPOINTMENT (OUTPATIENT)
Dept: OBGYN | Facility: CLINIC | Age: 30
End: 2025-01-30
Payer: COMMERCIAL

## 2025-01-30 VITALS
WEIGHT: 257 LBS | DIASTOLIC BLOOD PRESSURE: 74 MMHG | BODY MASS INDEX: 41.3 KG/M2 | SYSTOLIC BLOOD PRESSURE: 110 MMHG | HEIGHT: 66 IN

## 2025-01-30 DIAGNOSIS — Z83.3 FAMILY HISTORY OF DIABETES MELLITUS: ICD-10-CM

## 2025-01-30 DIAGNOSIS — Z78.9 OTHER SPECIFIED HEALTH STATUS: ICD-10-CM

## 2025-01-30 DIAGNOSIS — E66.9 OBESITY, UNSPECIFIED: ICD-10-CM

## 2025-01-30 DIAGNOSIS — Z01.419 ENCOUNTER FOR GYNECOLOGICAL EXAMINATION (GENERAL) (ROUTINE) W/OUT ABNORMAL FINDINGS: ICD-10-CM

## 2025-01-30 DIAGNOSIS — N91.1 SECONDARY AMENORRHEA: ICD-10-CM

## 2025-01-30 DIAGNOSIS — Z82.3 FAMILY HISTORY OF STROKE: ICD-10-CM

## 2025-01-30 LAB
HCG UR QL: POSITIVE
QUALITY CONTROL: YES

## 2025-01-30 PROCEDURE — 99459 PELVIC EXAMINATION: CPT

## 2025-01-30 PROCEDURE — 81025 URINE PREGNANCY TEST: CPT

## 2025-01-30 PROCEDURE — 99385 PREV VISIT NEW AGE 18-39: CPT

## 2025-01-30 RX ORDER — PNV NO.95/FERROUS FUM/FOLIC AC 28MG-0.8MG
28-0.8 TABLET ORAL DAILY
Qty: 90 | Refills: 3 | Status: ACTIVE | COMMUNITY
Start: 2025-01-30 | End: 1900-01-01

## 2025-01-30 RX ORDER — ALBUTEROL SULFATE 90 UG/1
108 (90 BASE) INHALANT RESPIRATORY (INHALATION)
Refills: 0 | Status: ACTIVE | COMMUNITY

## 2025-01-30 RX ORDER — ERGOCALCIFEROL (VITAMIN D2) 10 MCG
TABLET ORAL
Refills: 0 | Status: ACTIVE | COMMUNITY

## 2025-02-03 LAB
C TRACH RRNA SPEC QL NAA+PROBE: NOT DETECTED
N GONORRHOEA RRNA SPEC QL NAA+PROBE: NOT DETECTED
SOURCE AMPLIFICATION: NORMAL

## 2025-02-10 DIAGNOSIS — Z13.79 ENCOUNTER FOR OTHER SCREENING FOR GENETIC AND CHROMOSOMAL ANOMALIES: ICD-10-CM

## 2025-02-10 DIAGNOSIS — Z34.91 ENCOUNTER FOR SUPERVISION OF NORMAL PREGNANCY, UNSPECIFIED, FIRST TRIMESTER: ICD-10-CM

## 2025-02-13 ENCOUNTER — LABORATORY RESULT (OUTPATIENT)
Age: 30
End: 2025-02-13

## 2025-02-14 ENCOUNTER — ASOB RESULT (OUTPATIENT)
Age: 30
End: 2025-02-14

## 2025-02-14 ENCOUNTER — APPOINTMENT (OUTPATIENT)
Dept: ANTEPARTUM | Facility: CLINIC | Age: 30
End: 2025-02-14
Payer: COMMERCIAL

## 2025-02-14 ENCOUNTER — APPOINTMENT (OUTPATIENT)
Dept: OBGYN | Facility: CLINIC | Age: 30
End: 2025-02-14
Payer: COMMERCIAL

## 2025-02-14 ENCOUNTER — TRANSCRIPTION ENCOUNTER (OUTPATIENT)
Age: 30
End: 2025-02-14

## 2025-02-14 DIAGNOSIS — Z3A.08 8 WEEKS GESTATION OF PREGNANCY: ICD-10-CM

## 2025-02-14 PROCEDURE — 76817 TRANSVAGINAL US OBSTETRIC: CPT

## 2025-02-14 PROCEDURE — 0500F INITIAL PRENATAL CARE VISIT: CPT

## 2025-02-16 LAB
ALBUMIN SERPL ELPH-MCNC: 4.1 G/DL
ALP BLD-CCNC: 76 U/L
ALT SERPL-CCNC: 14 U/L
ANION GAP SERPL CALC-SCNC: 16 MMOL/L
APPEARANCE: CLEAR
APPEARANCE: CLEAR
AST SERPL-CCNC: 17 U/L
B19V IGG SER QL IA: 0.21 INDEX
B19V IGG+IGM SER-IMP: NEGATIVE
B19V IGG+IGM SER-IMP: NORMAL
B19V IGM FLD-ACNC: 0.11 INDEX
B19V IGM SER-ACNC: NEGATIVE
BILIRUB SERPL-MCNC: 0.5 MG/DL
BILIRUBIN URINE: NEGATIVE
BILIRUBIN URINE: NEGATIVE
BLOOD URINE: NEGATIVE
BLOOD URINE: NEGATIVE
BUN SERPL-MCNC: 9 MG/DL
CALCIUM SERPL-MCNC: 9.3 MG/DL
CHLORIDE SERPL-SCNC: 100 MMOL/L
CO2 SERPL-SCNC: 21 MMOL/L
COLOR: YELLOW
COLOR: YELLOW
CREAT SERPL-MCNC: 0.63 MG/DL
EGFR: 122 ML/MIN/1.73M2
ESTIMATED AVERAGE GLUCOSE: 94 MG/DL
GLUCOSE QUALITATIVE U: NEGATIVE
GLUCOSE QUALITATIVE U: NEGATIVE MG/DL
GLUCOSE SERPL-MCNC: 44 MG/DL
HBA1C MFR BLD HPLC: 4.9 %
HCT VFR BLD CALC: 34 %
HGB BLD-MCNC: 11.6 G/DL
KETONES URINE: NEGATIVE
KETONES URINE: NEGATIVE MG/DL
LEUKOCYTE ESTERASE URINE: NEGATIVE
LEUKOCYTE ESTERASE URINE: NEGATIVE
MCHC RBC-ENTMCNC: 32.5 PG
MCHC RBC-ENTMCNC: 34.1 G/DL
MCV RBC AUTO: 95.2 FL
MEV IGG FLD QL IA: 50.2 AU/ML
MEV IGG+IGM SER-IMP: POSITIVE
MUV AB SER-ACNC: POSITIVE
MUV IGG SER QL IA: 77.4 AU/ML
NITRITE URINE: NEGATIVE
NITRITE URINE: NEGATIVE
PH URINE: 7
PH URINE: 7.5
PLATELET # BLD AUTO: 282 K/UL
POTASSIUM SERPL-SCNC: 4.6 MMOL/L
PROT SERPL-MCNC: 7.2 G/DL
PROTEIN URINE: NEGATIVE
PROTEIN URINE: NEGATIVE MG/DL
RBC # BLD: 3.57 M/UL
RBC # FLD: 11.9 %
RUBV IGG FLD-ACNC: 1.22 INDEX
RUBV IGG SER-IMP: POSITIVE
SODIUM SERPL-SCNC: 137 MMOL/L
SPECIFIC GRAVITY URINE: 1.02
SPECIFIC GRAVITY URINE: 1.02
T PALLIDUM AB SER QL IA: NEGATIVE
TSH SERPL-ACNC: 1.84 UIU/ML
UROBILINOGEN URINE: 1 (ref 0.2–?)
UROBILINOGEN URINE: 1 MG/DL
VZV AB TITR SER: POSITIVE
VZV IGG SER IF-ACNC: 2.42 S/CO
WBC # FLD AUTO: 9.41 K/UL

## 2025-02-17 LAB
HBV SURFACE AG SER QL: NONREACTIVE
HCV AB SER QL: NONREACTIVE
HCV S/CO RATIO: 0.08 S/CO
HGB A MFR BLD: 97.7 %
HGB A2 MFR BLD: 2.3 %
HGB FRACT BLD-IMP: NORMAL
HIV1+2 AB SPEC QL IA.RAPID: NONREACTIVE
LEAD BLD-MCNC: <1 UG/DL

## 2025-02-19 ENCOUNTER — NON-APPOINTMENT (OUTPATIENT)
Age: 30
End: 2025-02-19

## 2025-02-19 LAB
FMR1 GENE MUT ANL BLD/T: NORMAL
M TB IFN-G BLD-IMP: NEGATIVE
QUANTIFERON TB PLUS MITOGEN MINUS NIL: >10 IU/ML
QUANTIFERON TB PLUS NIL: 0.02 IU/ML
QUANTIFERON TB PLUS TB1 MINUS NIL: 0 IU/ML
QUANTIFERON TB PLUS TB2 MINUS NIL: 0 IU/ML

## 2025-02-20 LAB — AR GENE MUT ANL BLD/T: NORMAL

## 2025-03-03 ENCOUNTER — APPOINTMENT (OUTPATIENT)
Dept: OBGYN | Facility: CLINIC | Age: 30
End: 2025-03-03
Payer: COMMERCIAL

## 2025-03-03 VITALS
WEIGHT: 268 LBS | DIASTOLIC BLOOD PRESSURE: 76 MMHG | BODY MASS INDEX: 43.07 KG/M2 | SYSTOLIC BLOOD PRESSURE: 120 MMHG | HEIGHT: 66 IN

## 2025-03-03 PROBLEM — Z3A.10 10 WEEKS GESTATION OF PREGNANCY: Status: ACTIVE | Noted: 2025-03-03

## 2025-03-03 LAB
APPEARANCE: CLEAR
BILIRUBIN URINE: NEGATIVE
BLOOD URINE: NEGATIVE
COLOR: YELLOW
GLUCOSE QUALITATIVE U: NEGATIVE
KETONES URINE: NEGATIVE
LEUKOCYTE ESTERASE URINE: NEGATIVE
NITRITE URINE: NEGATIVE
PH URINE: 7
PROTEIN URINE: NEGATIVE
SPECIFIC GRAVITY URINE: 1.02
UROBILINOGEN URINE: 1 (ref 0.2–?)

## 2025-03-03 PROCEDURE — 0502F SUBSEQUENT PRENATAL CARE: CPT

## 2025-03-03 RX ORDER — FERROUS SULFATE 324(65)MG
324 (65 FE) TABLET, DELAYED RELEASE (ENTERIC COATED) ORAL
Qty: 30 | Refills: 5 | Status: ACTIVE | COMMUNITY
Start: 2025-03-03 | End: 1900-01-01

## 2025-03-10 ENCOUNTER — OUTPATIENT (OUTPATIENT)
Dept: OUTPATIENT SERVICES | Facility: HOSPITAL | Age: 30
LOS: 1 days | Discharge: HOME | End: 2025-03-10

## 2025-03-10 DIAGNOSIS — Z34.91 ENCOUNTER FOR SUPERVISION OF NORMAL PREGNANCY, UNSPECIFIED, FIRST TRIMESTER: ICD-10-CM

## 2025-03-10 DIAGNOSIS — Z3A.10 10 WEEKS GESTATION OF PREGNANCY: ICD-10-CM

## 2025-03-10 DIAGNOSIS — D64.9 ANEMIA, UNSPECIFIED: ICD-10-CM

## 2025-03-10 DIAGNOSIS — Z3A.08 8 WEEKS GESTATION OF PREGNANCY: ICD-10-CM

## 2025-03-17 ENCOUNTER — NON-APPOINTMENT (OUTPATIENT)
Age: 30
End: 2025-03-17

## 2025-03-17 ENCOUNTER — APPOINTMENT (OUTPATIENT)
Dept: OBGYN | Facility: CLINIC | Age: 30
End: 2025-03-17
Payer: COMMERCIAL

## 2025-03-17 VITALS
HEIGHT: 66 IN | BODY MASS INDEX: 42.43 KG/M2 | DIASTOLIC BLOOD PRESSURE: 62 MMHG | WEIGHT: 264 LBS | SYSTOLIC BLOOD PRESSURE: 128 MMHG

## 2025-03-17 PROBLEM — Z3A.12 12 WEEKS GESTATION OF PREGNANCY: Status: ACTIVE | Noted: 2025-03-17

## 2025-03-17 LAB
APPEARANCE: CLEAR
BILIRUBIN URINE: NEGATIVE
BLOOD URINE: NEGATIVE
COLOR: YELLOW
GLUCOSE QUALITATIVE U: NEGATIVE
KETONES URINE: NEGATIVE
LEUKOCYTE ESTERASE URINE: NEGATIVE
NITRITE URINE: NEGATIVE
PH URINE: 8
PROTEIN URINE: ABNORMAL
SPECIFIC GRAVITY URINE: 1.02
UROBILINOGEN URINE: 1 (ref 0.2–?)

## 2025-03-17 PROCEDURE — 0502F SUBSEQUENT PRENATAL CARE: CPT

## 2025-03-17 RX ORDER — ASPIRIN 81 MG/1
81 TABLET ORAL DAILY
Qty: 30 | Refills: 3 | Status: ACTIVE | COMMUNITY
Start: 2025-03-17 | End: 1900-01-01

## 2025-03-18 ENCOUNTER — ASOB RESULT (OUTPATIENT)
Age: 30
End: 2025-03-18

## 2025-03-18 ENCOUNTER — APPOINTMENT (OUTPATIENT)
Dept: ANTEPARTUM | Facility: CLINIC | Age: 30
End: 2025-03-18
Payer: COMMERCIAL

## 2025-03-18 PROCEDURE — 76813 OB US NUCHAL MEAS 1 GEST: CPT

## 2025-03-19 ENCOUNTER — ASOB RESULT (OUTPATIENT)
Age: 30
End: 2025-03-19

## 2025-03-19 ENCOUNTER — APPOINTMENT (OUTPATIENT)
Dept: MATERNAL FETAL MEDICINE | Facility: CLINIC | Age: 30
End: 2025-03-19
Payer: COMMERCIAL

## 2025-03-19 PROCEDURE — 99202 OFFICE O/P NEW SF 15 MIN: CPT | Mod: 95

## 2025-03-24 ENCOUNTER — RESULT REVIEW (OUTPATIENT)
Age: 30
End: 2025-03-24

## 2025-03-24 ENCOUNTER — NON-APPOINTMENT (OUTPATIENT)
Age: 30
End: 2025-03-24

## 2025-03-24 ENCOUNTER — APPOINTMENT (OUTPATIENT)
Dept: HEMATOLOGY ONCOLOGY | Facility: CLINIC | Age: 30
End: 2025-03-24
Payer: COMMERCIAL

## 2025-03-24 ENCOUNTER — APPOINTMENT (OUTPATIENT)
Dept: ANTEPARTUM | Facility: CLINIC | Age: 30
End: 2025-03-24
Payer: COMMERCIAL

## 2025-03-24 VITALS
HEIGHT: 66 IN | WEIGHT: 267.6 LBS | SYSTOLIC BLOOD PRESSURE: 109 MMHG | HEART RATE: 75 BPM | TEMPERATURE: 97.5 F | BODY MASS INDEX: 43.01 KG/M2 | DIASTOLIC BLOOD PRESSURE: 72 MMHG | OXYGEN SATURATION: 97 %

## 2025-03-24 PROBLEM — O99.019 ANEMIA IN PREGNANCY: Status: ACTIVE | Noted: 2025-03-24

## 2025-03-24 LAB
BASOPHILS # BLD AUTO: 0.03 K/UL — SIGNIFICANT CHANGE UP (ref 0–0.2)
BASOPHILS NFR BLD AUTO: 0.3 % — SIGNIFICANT CHANGE UP (ref 0–2)
EOSINOPHIL # BLD AUTO: 0.09 K/UL — SIGNIFICANT CHANGE UP (ref 0–0.5)
EOSINOPHIL NFR BLD AUTO: 1 % — SIGNIFICANT CHANGE UP (ref 0–6)
HCT VFR BLD CALC: 31.5 % — LOW (ref 34.5–45)
HGB BLD-MCNC: 11.2 G/DL — LOW (ref 11.5–15.5)
IMM GRANULOCYTES # BLD AUTO: 0.04 K/UL — SIGNIFICANT CHANGE UP (ref 0–0.07)
IMM GRANULOCYTES NFR BLD AUTO: 0.4 % — SIGNIFICANT CHANGE UP (ref 0–0.9)
LYMPHOCYTES # BLD AUTO: 2.06 K/UL — SIGNIFICANT CHANGE UP (ref 1–3.3)
LYMPHOCYTES NFR BLD AUTO: 22.6 % — SIGNIFICANT CHANGE UP (ref 13–44)
MCHC RBC-ENTMCNC: 32.6 PG — SIGNIFICANT CHANGE UP (ref 27–34)
MCHC RBC-ENTMCNC: 35.6 G/DL — SIGNIFICANT CHANGE UP (ref 32–36)
MCV RBC AUTO: 91.6 FL — SIGNIFICANT CHANGE UP (ref 80–100)
MONOCYTES # BLD AUTO: 0.46 K/UL — SIGNIFICANT CHANGE UP (ref 0–0.9)
MONOCYTES NFR BLD AUTO: 5 % — SIGNIFICANT CHANGE UP (ref 2–14)
NEUTROPHILS # BLD AUTO: 6.44 K/UL — SIGNIFICANT CHANGE UP (ref 1.8–7.4)
NEUTROPHILS NFR BLD AUTO: 70.7 % — SIGNIFICANT CHANGE UP (ref 43–77)
NRBC # BLD AUTO: 0 K/UL — SIGNIFICANT CHANGE UP (ref 0–0)
NRBC # FLD: 0 K/UL — SIGNIFICANT CHANGE UP (ref 0–0)
NRBC BLD AUTO-RTO: 0 /100 WBCS — SIGNIFICANT CHANGE UP (ref 0–0)
PLATELET # BLD AUTO: 227 K/UL — SIGNIFICANT CHANGE UP (ref 150–400)
PMV BLD: 9.1 FL — SIGNIFICANT CHANGE UP (ref 7–13)
RBC # BLD: 3.44 M/UL — LOW (ref 3.8–5.2)
RBC # FLD: 11.6 % — SIGNIFICANT CHANGE UP (ref 10.3–14.5)
WBC # BLD: 9.12 K/UL — SIGNIFICANT CHANGE UP (ref 3.8–10.5)
WBC # FLD AUTO: 9.12 K/UL — SIGNIFICANT CHANGE UP (ref 3.8–10.5)

## 2025-03-24 PROCEDURE — 36415 COLL VENOUS BLD VENIPUNCTURE: CPT

## 2025-03-24 PROCEDURE — 99203 OFFICE O/P NEW LOW 30 MIN: CPT

## 2025-03-25 LAB
ALBUMIN SERPL ELPH-MCNC: 3.9 G/DL
ALP BLD-CCNC: 56 U/L
ALT SERPL-CCNC: 18 U/L
ANION GAP SERPL CALC-SCNC: 9 MMOL/L
AST SERPL-CCNC: 14 U/L
BILIRUB SERPL-MCNC: 0.6 MG/DL
BUN SERPL-MCNC: 6 MG/DL
CALCIUM SERPL-MCNC: 9 MG/DL
CHLORIDE SERPL-SCNC: 102 MMOL/L
CO2 SERPL-SCNC: 24 MMOL/L
CREAT SERPL-MCNC: 0.63 MG/DL
EGFRCR SERPLBLD CKD-EPI 2021: 122 ML/MIN/1.73M2
FERRITIN SERPL-MCNC: 135 NG/ML
FOLATE SERPL-MCNC: >20 NG/ML
GLUCOSE SERPL-MCNC: 92 MG/DL
IRON SATN MFR SERPL: 53 %
IRON SERPL-MCNC: 156 UG/DL
POTASSIUM SERPL-SCNC: 3.7 MMOL/L
PROT SERPL-MCNC: 6.9 G/DL
SODIUM SERPL-SCNC: 135 MMOL/L
TIBC SERPL-MCNC: 295 UG/DL
UIBC SERPL-MCNC: 139 UG/DL
VIT B12 SERPL-MCNC: 345 PG/ML

## 2025-03-26 DIAGNOSIS — Z98.890 OTHER SPECIFIED POSTPROCEDURAL STATES: ICD-10-CM

## 2025-03-26 DIAGNOSIS — Z3A.12 12 WEEKS GESTATION OF PREGNANCY: ICD-10-CM

## 2025-03-26 DIAGNOSIS — O28.9 UNSPECIFIED ABNORMAL FINDINGS ON ANTENATAL SCREENING OF MOTHER: ICD-10-CM

## 2025-03-26 DIAGNOSIS — Z20.822 CONTACT WITH AND (SUSPECTED) EXPOSURE TO COVID-19: ICD-10-CM

## 2025-03-26 DIAGNOSIS — Z3A.13 13 WEEKS GESTATION OF PREGNANCY: ICD-10-CM

## 2025-03-26 PROBLEM — O35.9XX0 SUSPECTED FETAL ABNORMALITY AFFECTING MANAGEMENT OF MOTHER: Status: ACTIVE | Noted: 2025-03-26

## 2025-03-26 PROBLEM — Z34.92 SECOND TRIMESTER PREGNANCY: Status: ACTIVE | Noted: 2025-03-26

## 2025-03-27 ENCOUNTER — APPOINTMENT (OUTPATIENT)
Dept: MATERNAL FETAL MEDICINE | Facility: CLINIC | Age: 30
End: 2025-03-27
Payer: COMMERCIAL

## 2025-03-27 ENCOUNTER — APPOINTMENT (OUTPATIENT)
Dept: ANTEPARTUM | Facility: CLINIC | Age: 30
End: 2025-03-27
Payer: COMMERCIAL

## 2025-03-27 ENCOUNTER — ASOB RESULT (OUTPATIENT)
Age: 30
End: 2025-03-27

## 2025-03-27 PROCEDURE — 76945 ECHO GUIDE VILLUS SAMPLING: CPT

## 2025-03-27 PROCEDURE — ZZZZZ: CPT

## 2025-03-27 PROCEDURE — 59015 CHORION BIOPSY: CPT

## 2025-03-28 ENCOUNTER — NON-APPOINTMENT (OUTPATIENT)
Age: 30
End: 2025-03-28

## 2025-03-31 ENCOUNTER — NON-APPOINTMENT (OUTPATIENT)
Age: 30
End: 2025-03-31

## 2025-03-31 VITALS — WEIGHT: 267 LBS | HEIGHT: 66 IN | BODY MASS INDEX: 42.91 KG/M2

## 2025-04-01 ENCOUNTER — APPOINTMENT (OUTPATIENT)
Dept: OBGYN | Facility: CLINIC | Age: 30
End: 2025-04-01
Payer: COMMERCIAL

## 2025-04-01 DIAGNOSIS — O35.9XX0 MATERNAL CARE FOR (SUSPECTED) FETAL ABNORMALITY AND DAMAGE, UNSPECIFIED, NOT APPLICABLE OR UNSPECIFIED: ICD-10-CM

## 2025-04-01 DIAGNOSIS — J45.30 MILD PERSISTENT ASTHMA, UNCOMPLICATED: ICD-10-CM

## 2025-04-01 PROCEDURE — 99214 OFFICE O/P EST MOD 30 MIN: CPT | Mod: 95

## 2025-04-01 RX ORDER — IBUPROFEN 600 MG/1
600 TABLET, FILM COATED ORAL 4 TIMES DAILY
Qty: 60 | Refills: 0 | Status: ACTIVE | COMMUNITY
Start: 2025-04-01 | End: 1900-01-01

## 2025-04-02 ENCOUNTER — APPOINTMENT (OUTPATIENT)
Dept: OBGYN | Facility: CLINIC | Age: 30
End: 2025-04-02
Payer: COMMERCIAL

## 2025-04-02 VITALS
TEMPERATURE: 97 F | OXYGEN SATURATION: 99 % | HEART RATE: 80 BPM | DIASTOLIC BLOOD PRESSURE: 80 MMHG | SYSTOLIC BLOOD PRESSURE: 110 MMHG

## 2025-04-02 DIAGNOSIS — O35.9XX0 MATERNAL CARE FOR (SUSPECTED) FETAL ABNORMALITY AND DAMAGE, UNSPECIFIED, NOT APPLICABLE OR UNSPECIFIED: ICD-10-CM

## 2025-04-02 PROCEDURE — S0190: CPT

## 2025-04-02 RX ORDER — MIFEPRISTONE 200 MG/1
200 TABLET ORAL
Refills: 0 | Status: COMPLETED | OUTPATIENT
Start: 2025-04-02

## 2025-04-02 RX ADMIN — MIFEPRISTONE 0 MG: 200 TABLET ORAL at 00:00

## 2025-04-03 ENCOUNTER — APPOINTMENT (OUTPATIENT)
Dept: OBGYN | Facility: HOSPITAL | Age: 30
End: 2025-04-03

## 2025-04-03 ENCOUNTER — TRANSCRIPTION ENCOUNTER (OUTPATIENT)
Age: 30
End: 2025-04-03

## 2025-04-03 ENCOUNTER — OUTPATIENT (OUTPATIENT)
Dept: INPATIENT UNIT | Facility: HOSPITAL | Age: 30
LOS: 1 days | End: 2025-04-03
Payer: COMMERCIAL

## 2025-04-03 ENCOUNTER — RESULT REVIEW (OUTPATIENT)
Age: 30
End: 2025-04-03

## 2025-04-03 VITALS
SYSTOLIC BLOOD PRESSURE: 115 MMHG | OXYGEN SATURATION: 95 % | HEART RATE: 66 BPM | DIASTOLIC BLOOD PRESSURE: 89 MMHG | RESPIRATION RATE: 14 BRPM | TEMPERATURE: 98 F

## 2025-04-03 VITALS
HEIGHT: 66 IN | OXYGEN SATURATION: 99 % | WEIGHT: 266.98 LBS | RESPIRATION RATE: 18 BRPM | TEMPERATURE: 98 F | SYSTOLIC BLOOD PRESSURE: 112 MMHG | HEART RATE: 77 BPM | DIASTOLIC BLOOD PRESSURE: 73 MMHG

## 2025-04-03 DIAGNOSIS — Z33.2 ENCOUNTER FOR ELECTIVE TERMINATION OF PREGNANCY: ICD-10-CM

## 2025-04-03 DIAGNOSIS — O92.70 UNSPECIFIED DISORDERS OF LACTATION: ICD-10-CM

## 2025-04-03 DIAGNOSIS — O35.9XX0 MATERNAL CARE FOR (SUSPECTED) FETAL ABNORMALITY AND DAMAGE, UNSPECIFIED, NOT APPLICABLE OR UNSPECIFIED: ICD-10-CM

## 2025-04-03 DIAGNOSIS — Z3A.14 14 WEEKS GESTATION OF PREGNANCY: ICD-10-CM

## 2025-04-03 LAB — BLD GP AB SCN SERPL QL: SIGNIFICANT CHANGE UP

## 2025-04-03 PROCEDURE — 88305 TISSUE EXAM BY PATHOLOGIST: CPT | Mod: 26

## 2025-04-03 PROCEDURE — 86901 BLOOD TYPING SEROLOGIC RH(D): CPT

## 2025-04-03 PROCEDURE — 86900 BLOOD TYPING SEROLOGIC ABO: CPT

## 2025-04-03 PROCEDURE — 76998 US GUIDE INTRAOP: CPT | Mod: 26,GC

## 2025-04-03 PROCEDURE — 86850 RBC ANTIBODY SCREEN: CPT

## 2025-04-03 PROCEDURE — 59841 INDUCED ABORTION DILAT&EVAC: CPT

## 2025-04-03 PROCEDURE — 88305 TISSUE EXAM BY PATHOLOGIST: CPT

## 2025-04-03 PROCEDURE — 36415 COLL VENOUS BLD VENIPUNCTURE: CPT

## 2025-04-03 RX ORDER — SODIUM CHLORIDE 9 G/1000ML
1000 INJECTION, SOLUTION INTRAVENOUS
Refills: 0 | Status: DISCONTINUED | OUTPATIENT
Start: 2025-04-03 | End: 2025-04-03

## 2025-04-03 RX ORDER — DOXYCYCLINE HYCLATE 100 MG
200 TABLET ORAL ONCE
Refills: 0 | Status: COMPLETED | OUTPATIENT
Start: 2025-04-03 | End: 2025-04-03

## 2025-04-03 RX ORDER — FENTANYL CITRATE-0.9 % NACL/PF 100MCG/2ML
50 SYRINGE (ML) INTRAVENOUS
Refills: 0 | Status: DISCONTINUED | OUTPATIENT
Start: 2025-04-03 | End: 2025-04-03

## 2025-04-03 RX ORDER — ONDANSETRON HCL/PF 4 MG/2 ML
4 VIAL (ML) INJECTION ONCE
Refills: 0 | Status: DISCONTINUED | OUTPATIENT
Start: 2025-04-03 | End: 2025-04-03

## 2025-04-03 RX ORDER — ACETAMINOPHEN 500 MG/5ML
3 LIQUID (ML) ORAL
Qty: 0 | Refills: 0 | DISCHARGE
Start: 2025-04-03

## 2025-04-03 RX ORDER — ACETAMINOPHEN 500 MG/5ML
975 LIQUID (ML) ORAL ONCE
Refills: 0 | Status: COMPLETED | OUTPATIENT
Start: 2025-04-03 | End: 2025-04-03

## 2025-04-03 RX ADMIN — Medication 50 MICROGRAM(S): at 14:39

## 2025-04-03 RX ADMIN — Medication 250 MILLIGRAM(S): at 13:05

## 2025-04-03 RX ADMIN — Medication 975 MILLIGRAM(S): at 12:32

## 2025-04-03 RX ADMIN — Medication 50 MICROGRAM(S): at 15:00

## 2025-04-03 NOTE — BRIEF OPERATIVE NOTE - OPERATION/FINDINGS
Intraoperative ultrasound guidance performed by Dr. Ribera  14-15 week size gravid uterus  Thin endometrial echo at conclusion of procedure  Products of conception examined and consistent with gestational age

## 2025-04-03 NOTE — BRIEF OPERATIVE NOTE - NSICDXBRIEFPROCEDURE_GEN_ALL_CORE_FT
PROCEDURES:  Induced , by dilation and evacuation 2025 13:38:07  Rita Barker  Intraoperative ultrasound guidance 2025 13:38:14  Rita Barker

## 2025-04-03 NOTE — BRIEF OPERATIVE NOTE - NSICDXBRIEFPREOP_GEN_ALL_CORE_FT
PRE-OP DIAGNOSIS:  Known fetal anomaly, antepartum 03-Apr-2025 13:39:00  Rita Barker  14 weeks gestation of pregnancy 03-Apr-2025 13:39:38  Rita Barker

## 2025-04-03 NOTE — ASU DISCHARGE PLAN (ADULT/PEDIATRIC) - ASU DC SPECIAL INSTRUCTIONSFT
Lugol's solution was placed on the cervix. If you notice black-colored vaginal discharge, do not worry!  This is normal, and it is due to the Logul's solution.

## 2025-04-03 NOTE — ASU DISCHARGE PLAN (ADULT/PEDIATRIC) - FINANCIAL ASSISTANCE
[facility] provides services at a reduced cost to those who are determined to be eligible through [facility]’s financial assistance program. Information regarding [facility]’s financial assistance program can be found by going to [email or web address] or by calling [phone].

## 2025-04-03 NOTE — BRIEF OPERATIVE NOTE - NSICDXBRIEFPOSTOP_GEN_ALL_CORE_FT
POST-OP DIAGNOSIS:  Known fetal anomaly, antepartum 03-Apr-2025 13:39:44  Rita Barker  14 weeks gestation of pregnancy 03-Apr-2025 13:39:51  Rita Barker

## 2025-04-03 NOTE — ASU DISCHARGE PLAN (ADULT/PEDIATRIC) - MEDICATION INSTRUCTIONS
Take tylenol 975mg by mouth every 6 hours, alternating with ibuprofen 600mg by mouth every 6 hours as needed for pain

## 2025-04-03 NOTE — ASU DISCHARGE PLAN (ADULT/PEDIATRIC) - CARE PROVIDER_API CALL
Rita Barker  [facility]  [address], [address]  [place] [address]  Phone: [phone]  Fax: [phone]  Established Patient  Follow Up Time: 2 weeks

## 2025-04-07 ENCOUNTER — APPOINTMENT (OUTPATIENT)
Dept: OBGYN | Facility: CLINIC | Age: 30
End: 2025-04-07

## 2025-04-07 ENCOUNTER — NON-APPOINTMENT (OUTPATIENT)
Age: 30
End: 2025-04-07

## 2025-04-07 ENCOUNTER — APPOINTMENT (OUTPATIENT)
Dept: ANTEPARTUM | Facility: CLINIC | Age: 30
End: 2025-04-07

## 2025-04-07 DIAGNOSIS — Z3A.13 13 WEEKS GESTATION OF PREGNANCY: ICD-10-CM

## 2025-04-07 DIAGNOSIS — Z34.92 ENCOUNTER FOR SUPERVISION OF NORMAL PREGNANCY, UNSPECIFIED, SECOND TRIMESTER: ICD-10-CM

## 2025-04-07 DIAGNOSIS — O99.019 ANEMIA COMPLICATING PREGNANCY, UNSPECIFIED TRIMESTER: ICD-10-CM

## 2025-04-07 PROBLEM — O92.70 LACTATION DISORDER: Status: ACTIVE | Noted: 2025-04-07

## 2025-04-07 LAB — SURGICAL PATHOLOGY STUDY: SIGNIFICANT CHANGE UP

## 2025-04-07 RX ORDER — CABERGOLINE 0.5 MG/1
0.5 TABLET ORAL
Qty: 2 | Refills: 0 | Status: ACTIVE | COMMUNITY
Start: 2025-04-07 | End: 1900-01-01

## 2025-04-07 NOTE — PATIENT PROFILE ADULT. - FUNCTIONAL SCREEN CURRENT LEVEL: SWALLOWING (IF SCORE 2 OR MORE FOR ANY ITEM, CONSULT REHAB SERVICES), MLM)
Now MARQUEZ on CKD3. Nephrology consulted. Patient on bumex and potassium supplement as home which have been held   (0) swallows foods/liquids without difficulty

## 2025-04-08 ENCOUNTER — APPOINTMENT (OUTPATIENT)
Dept: ANTEPARTUM | Facility: CLINIC | Age: 30
End: 2025-04-08

## 2025-04-11 DIAGNOSIS — Z09 ENCOUNTER FOR FOLLOW-UP EXAMINATION AFTER COMPLETED TREATMENT FOR CONDITIONS OTHER THAN MALIGNANT NEOPLASM: ICD-10-CM

## 2025-04-16 ENCOUNTER — APPOINTMENT (OUTPATIENT)
Dept: OBGYN | Facility: CLINIC | Age: 30
End: 2025-04-16

## 2025-04-25 DIAGNOSIS — Z09 ENCOUNTER FOR FOLLOW-UP EXAMINATION AFTER COMPLETED TREATMENT FOR CONDITIONS OTHER THAN MALIGNANT NEOPLASM: ICD-10-CM

## 2025-04-29 ENCOUNTER — APPOINTMENT (OUTPATIENT)
Dept: OBGYN | Facility: CLINIC | Age: 30
End: 2025-04-29
Payer: COMMERCIAL

## 2025-04-29 PROCEDURE — 99212 OFFICE O/P EST SF 10 MIN: CPT | Mod: 93

## 2025-05-13 ENCOUNTER — APPOINTMENT (OUTPATIENT)
Dept: ANTEPARTUM | Facility: CLINIC | Age: 30
End: 2025-05-13

## 2025-07-18 ENCOUNTER — APPOINTMENT (OUTPATIENT)
Dept: OBGYN | Facility: CLINIC | Age: 30
End: 2025-07-18

## 2025-07-18 VITALS
BODY MASS INDEX: 42.11 KG/M2 | SYSTOLIC BLOOD PRESSURE: 120 MMHG | HEIGHT: 66 IN | DIASTOLIC BLOOD PRESSURE: 74 MMHG | WEIGHT: 262 LBS

## 2025-07-18 PROBLEM — Z34.90 PREGNANCY AT EARLY STAGE: Status: ACTIVE | Noted: 2025-07-18

## 2025-07-18 PROBLEM — B37.2 YEAST INFECTION OF THE SKIN: Status: ACTIVE | Noted: 2025-07-18

## 2025-07-18 PROBLEM — Z86.2 HISTORY OF ANEMIA: Status: ACTIVE | Noted: 2025-07-18

## 2025-07-18 PROBLEM — R06.09 DOE (DYSPNEA ON EXERTION): Status: RESOLVED | Noted: 2020-05-29 | Resolved: 2025-07-18

## 2025-07-18 PROBLEM — J45.909 ASTHMA: Status: ACTIVE | Noted: 2025-07-18

## 2025-07-18 PROBLEM — Z98.890 HISTORY OF GASTRIC SURGERY: Status: RESOLVED | Noted: 2025-03-26 | Resolved: 2025-07-18

## 2025-07-18 PROBLEM — O35.9XX0 FETAL ANOMALY NECESSITATING DELIVERY, SINGLE OR UNSPECIFIED FETUS: Status: RESOLVED | Noted: 2025-04-01 | Resolved: 2025-07-18

## 2025-07-18 PROBLEM — Z87.891 FORMER ELECTRONIC CIGARETTE USE: Status: ACTIVE | Noted: 2025-07-18

## 2025-07-18 PROBLEM — Z09 FOLLOW-UP EXAM: Status: RESOLVED | Noted: 2025-04-25 | Resolved: 2025-07-18

## 2025-07-18 PROBLEM — Z13.79 GENETIC SCREENING: Status: RESOLVED | Noted: 2025-02-10 | Resolved: 2025-07-18

## 2025-07-18 PROBLEM — O28.9 ABNORMAL FINDINGS ON ANTENATAL SCREENING OF MOTHER: Status: RESOLVED | Noted: 2025-03-26 | Resolved: 2025-07-18

## 2025-07-18 PROBLEM — N91.1 SECONDARY AMENORRHEA: Status: RESOLVED | Noted: 2025-01-30 | Resolved: 2025-07-18

## 2025-07-18 PROBLEM — O35.9XX0 SUSPECTED FETAL ABNORMALITY AFFECTING MANAGEMENT OF MOTHER: Status: RESOLVED | Noted: 2025-03-26 | Resolved: 2025-07-18

## 2025-07-18 PROBLEM — Z87.59 HISTORY OF LACTATION DISORDER: Status: RESOLVED | Noted: 2025-04-07 | Resolved: 2025-07-18

## 2025-07-18 PROBLEM — J45.30 ASTHMA, MILD PERSISTENT: Status: RESOLVED | Noted: 2020-05-29 | Resolved: 2025-07-18

## 2025-07-18 PROBLEM — Z87.19 HISTORY OF GASTROESOPHAGEAL REFLUX (GERD): Status: RESOLVED | Noted: 2020-05-29 | Resolved: 2025-07-18

## 2025-07-18 PROBLEM — Z01.419 VISIT FOR GYNECOLOGIC EXAMINATION: Status: RESOLVED | Noted: 2025-01-30 | Resolved: 2025-07-18

## 2025-07-18 PROCEDURE — 36415 COLL VENOUS BLD VENIPUNCTURE: CPT

## 2025-07-18 PROCEDURE — 99214 OFFICE O/P EST MOD 30 MIN: CPT

## 2025-07-18 PROCEDURE — 99459 PELVIC EXAMINATION: CPT | Mod: NC

## 2025-07-18 RX ORDER — CLOTRIMAZOLE AND BETAMETHASONE DIPROPIONATE 10; .5 MG/G; MG/G
1-0.05 CREAM TOPICAL TWICE DAILY
Qty: 1 | Refills: 0 | Status: ACTIVE | COMMUNITY
Start: 2025-07-18 | End: 1900-01-01

## 2025-07-18 RX ORDER — ALBUTEROL SULFATE 90 UG/1
108 (90 BASE) AEROSOL, METERED RESPIRATORY (INHALATION) EVERY 4 HOURS
Qty: 68 | Refills: 3 | Status: ACTIVE | COMMUNITY
Start: 2025-07-18 | End: 1900-01-01

## 2025-07-18 RX ORDER — PRENATAL WITH FERROUS FUM AND FOLIC ACID 3080; 920; 120; 400; 22; 1.84; 3; 20; 10; 1; 12; 200; 27; 25; 2 [IU]/1; [IU]/1; MG/1; [IU]/1; MG/1; MG/1; MG/1; MG/1; MG/1; MG/1; UG/1; MG/1; MG/1; MG/1; MG/1
27-1 TABLET ORAL
Qty: 90 | Refills: 3 | Status: ACTIVE | COMMUNITY
Start: 2025-07-18 | End: 1900-01-01

## 2025-07-18 RX ORDER — BUDESONIDE AND FORMOTEROL FUMARATE DIHYDRATE 80; 4.5 UG/1; UG/1
80-4.5 AEROSOL RESPIRATORY (INHALATION) TWICE DAILY
Qty: 1 | Refills: 3 | Status: ACTIVE | COMMUNITY
Start: 2025-07-18 | End: 1900-01-01

## 2025-07-21 LAB
BASOPHILS # BLD AUTO: 0.03 K/UL
BASOPHILS NFR BLD AUTO: 0.3 %
EOSINOPHIL # BLD AUTO: 0.09 K/UL
EOSINOPHIL NFR BLD AUTO: 1 %
HCG SERPL-MCNC: ABNORMAL MIU/ML
HCT VFR BLD CALC: 37.8 %
HGB BLD-MCNC: 12.1 G/DL
IMM GRANULOCYTES NFR BLD AUTO: 0.4 %
LYMPHOCYTES # BLD AUTO: 2.44 K/UL
LYMPHOCYTES NFR BLD AUTO: 26 %
MAN DIFF?: NORMAL
MCHC RBC-ENTMCNC: 31.3 PG
MCHC RBC-ENTMCNC: 32 G/DL
MCV RBC AUTO: 97.7 FL
MONOCYTES # BLD AUTO: 0.46 K/UL
MONOCYTES NFR BLD AUTO: 4.9 %
NEUTROPHILS # BLD AUTO: 6.34 K/UL
NEUTROPHILS NFR BLD AUTO: 67.4 %
PLATELET # BLD AUTO: 238 K/UL
RBC # BLD: 3.87 M/UL
RBC # FLD: 12.4 %
WBC # FLD AUTO: 9.4 K/UL

## 2025-07-30 ENCOUNTER — APPOINTMENT (OUTPATIENT)
Dept: OBGYN | Facility: CLINIC | Age: 30
End: 2025-07-30
Payer: COMMERCIAL

## 2025-07-30 ENCOUNTER — APPOINTMENT (OUTPATIENT)
Dept: OBGYN | Facility: CLINIC | Age: 30
End: 2025-07-30

## 2025-07-30 VITALS
SYSTOLIC BLOOD PRESSURE: 114 MMHG | DIASTOLIC BLOOD PRESSURE: 66 MMHG | HEIGHT: 66 IN | BODY MASS INDEX: 42.11 KG/M2 | WEIGHT: 262 LBS

## 2025-07-30 LAB
HCG UR QL: POSITIVE
QUALITY CONTROL: YES

## 2025-07-30 PROCEDURE — 99214 OFFICE O/P EST MOD 30 MIN: CPT

## 2025-07-30 PROCEDURE — 81025 URINE PREGNANCY TEST: CPT

## 2025-08-08 ENCOUNTER — ASOB RESULT (OUTPATIENT)
Age: 30
End: 2025-08-08

## 2025-08-08 ENCOUNTER — APPOINTMENT (OUTPATIENT)
Dept: ANTEPARTUM | Facility: CLINIC | Age: 30
End: 2025-08-08
Payer: COMMERCIAL

## 2025-08-08 PROCEDURE — 76801 OB US < 14 WKS SINGLE FETUS: CPT

## 2025-08-26 ENCOUNTER — NON-APPOINTMENT (OUTPATIENT)
Age: 30
End: 2025-08-26

## 2025-08-27 ENCOUNTER — APPOINTMENT (OUTPATIENT)
Dept: OBGYN | Facility: CLINIC | Age: 30
End: 2025-08-27
Payer: COMMERCIAL

## 2025-08-27 VITALS
WEIGHT: 265.2 LBS | HEIGHT: 66 IN | BODY MASS INDEX: 42.62 KG/M2 | SYSTOLIC BLOOD PRESSURE: 122 MMHG | DIASTOLIC BLOOD PRESSURE: 64 MMHG

## 2025-08-27 DIAGNOSIS — Z3A.12 12 WEEKS GESTATION OF PREGNANCY: ICD-10-CM

## 2025-08-27 PROCEDURE — 0502F SUBSEQUENT PRENATAL CARE: CPT

## 2025-08-28 ENCOUNTER — APPOINTMENT (OUTPATIENT)
Dept: ANTEPARTUM | Facility: CLINIC | Age: 30
End: 2025-08-28

## 2025-08-28 ENCOUNTER — ASOB RESULT (OUTPATIENT)
Age: 30
End: 2025-08-28

## 2025-08-28 LAB
APPEARANCE: CLEAR
BILIRUBIN URINE: NEGATIVE
BLOOD URINE: NEGATIVE
COLOR: YELLOW
GLUCOSE QUALITATIVE U: NEGATIVE
KETONES URINE: NEGATIVE
LEUKOCYTE ESTERASE URINE: NEGATIVE
NITRITE URINE: NEGATIVE
PH URINE: 8.5
PROTEIN URINE: ABNORMAL
SPECIFIC GRAVITY URINE: 1.02
UROBILINOGEN URINE: 4 (ref 0.2–?)

## 2025-08-28 PROCEDURE — 76813 OB US NUCHAL MEAS 1 GEST: CPT

## 2025-09-02 ENCOUNTER — APPOINTMENT (OUTPATIENT)
Dept: ANTEPARTUM | Facility: CLINIC | Age: 30
End: 2025-09-02
Payer: COMMERCIAL

## 2025-09-02 ENCOUNTER — APPOINTMENT (OUTPATIENT)
Dept: MATERNAL FETAL MEDICINE | Facility: CLINIC | Age: 30
End: 2025-09-02
Payer: COMMERCIAL

## 2025-09-02 ENCOUNTER — ASOB RESULT (OUTPATIENT)
Age: 30
End: 2025-09-02

## 2025-09-02 PROCEDURE — 36415 COLL VENOUS BLD VENIPUNCTURE: CPT

## 2025-09-02 PROCEDURE — 99213 OFFICE O/P EST LOW 20 MIN: CPT | Mod: 95

## 2025-09-10 ENCOUNTER — NON-APPOINTMENT (OUTPATIENT)
Age: 30
End: 2025-09-10

## 2025-09-15 ENCOUNTER — APPOINTMENT (OUTPATIENT)
Dept: OBGYN | Facility: CLINIC | Age: 30
End: 2025-09-15
Payer: COMMERCIAL

## 2025-09-15 VITALS
HEIGHT: 66 IN | DIASTOLIC BLOOD PRESSURE: 62 MMHG | BODY MASS INDEX: 43.32 KG/M2 | SYSTOLIC BLOOD PRESSURE: 122 MMHG | WEIGHT: 269.56 LBS

## 2025-09-15 PROBLEM — D64.9 ANEMIA, UNSPECIFIED TYPE: Status: ACTIVE | Noted: 2025-09-15

## 2025-09-15 PROBLEM — Z86.2 HISTORY OF ANEMIA: Status: RESOLVED | Noted: 2025-07-18 | Resolved: 2025-09-15

## 2025-09-15 PROBLEM — Z3A.12 12 WEEKS GESTATION OF PREGNANCY: Status: RESOLVED | Noted: 2025-08-27 | Resolved: 2025-09-15

## 2025-09-15 PROBLEM — Z3A.15 15 WEEKS GESTATION OF PREGNANCY: Status: RESOLVED | Noted: 2025-09-15 | Resolved: 2025-09-15

## 2025-09-15 LAB
APPEARANCE: CLEAR
BILIRUBIN URINE: NEGATIVE
BLOOD URINE: NEGATIVE
COLOR: YELLOW
GLUCOSE QUALITATIVE U: NEGATIVE
KETONES URINE: NEGATIVE
LEUKOCYTE ESTERASE URINE: NEGATIVE
NITRITE URINE: NEGATIVE
PH URINE: 7
PROTEIN URINE: 30
SPECIFIC GRAVITY URINE: 1.02
UROBILINOGEN URINE: 1 (ref 0.2–?)

## 2025-09-15 PROCEDURE — 0502F SUBSEQUENT PRENATAL CARE: CPT

## 2025-09-15 RX ORDER — FERROUS FUMARATE/ASCORBIC ACID 65MG-25 MG
65-25 TABLET, EXTENDED RELEASE ORAL
Qty: 180 | Refills: 2 | Status: ACTIVE | COMMUNITY
Start: 2025-09-15 | End: 1900-01-01

## 2025-09-18 ENCOUNTER — NON-APPOINTMENT (OUTPATIENT)
Age: 30
End: 2025-09-18

## 2025-09-22 PROBLEM — Z87.59 HISTORY OF SPONTANEOUS ABORTION: Status: RESOLVED | Noted: 2025-09-22 | Resolved: 2025-09-22

## 2025-09-22 PROBLEM — Z78.9 NON-SMOKER: Status: ACTIVE | Noted: 2025-09-22

## 2025-09-22 PROBLEM — O99.840 PREGNANCY COMPLICATED BY PREVIOUS BARIATRIC SURGERY: Status: RESOLVED | Noted: 2025-09-22 | Resolved: 2025-09-22

## 2025-09-22 PROBLEM — Z3A.15 15 WEEKS GESTATION OF PREGNANCY: Status: ACTIVE | Noted: 2025-09-22

## 2025-09-22 PROBLEM — O99.842 BARIATRIC SURGERY STATUS COMPLICATING PREGNANCY, SECOND TRIMESTER: Status: ACTIVE | Noted: 2025-09-22

## 2025-09-22 PROBLEM — Z87.891 FORMER ELECTRONIC CIGARETTE USE: Status: RESOLVED | Noted: 2025-07-18 | Resolved: 2025-09-22

## 2025-09-22 PROBLEM — Z87.09 HISTORY OF ASTHMA: Status: RESOLVED | Noted: 2025-07-18 | Resolved: 2025-09-22

## 2025-09-22 PROBLEM — Z34.90 PREGNANCY AT EARLY STAGE: Status: RESOLVED | Noted: 2025-07-18 | Resolved: 2025-09-22

## 2025-09-22 PROBLEM — O28.5 POSITIVE RESULT ON INTEGRATED PRENATAL SCREEN FOR TRISOMY 21: Status: RESOLVED | Noted: 2025-09-22 | Resolved: 2025-09-22

## 2025-09-22 PROBLEM — O99.519 ASTHMA DURING PREGNANCY: Status: ACTIVE | Noted: 2025-09-22

## 2025-09-22 PROBLEM — O99.210 MATERNAL MORBID OBESITY, ANTEPARTUM: Status: ACTIVE | Noted: 2025-09-22

## 2025-09-22 PROBLEM — O46.90 VAGINAL BLEEDING DURING PREGNANCY: Status: RESOLVED | Noted: 2025-09-22 | Resolved: 2025-09-22

## 2025-09-22 PROBLEM — E66.9 OBESITY (BMI 30-39.9): Status: RESOLVED | Noted: 2020-05-29 | Resolved: 2025-09-22

## 2025-09-22 PROBLEM — Z86.2 HISTORY OF ANEMIA: Status: RESOLVED | Noted: 2025-09-15 | Resolved: 2025-09-22

## 2025-09-22 PROBLEM — Z87.59 HISTORY OF ECTOPIC PREGNANCY: Status: RESOLVED | Noted: 2025-09-22 | Resolved: 2025-09-22

## 2025-09-22 PROBLEM — B37.2 YEAST INFECTION OF THE SKIN: Status: RESOLVED | Noted: 2025-07-18 | Resolved: 2025-09-22

## 2025-09-22 PROBLEM — O99.012 ANEMIA DURING PREGNANCY IN SECOND TRIMESTER: Status: ACTIVE | Noted: 2025-09-22

## 2025-09-22 PROBLEM — Z30.432 REMOVAL OF CONTRACEPTIVE INTRAUTERINE DEVICE (IUD) PERFORMED: Status: RESOLVED | Noted: 2025-09-22 | Resolved: 2025-09-22

## 2025-09-26 PROBLEM — Z34.92 SECOND TRIMESTER PREGNANCY: Status: ACTIVE | Noted: 2025-09-26

## (undated) DEVICE — VENODYNE/SCD SLEEVE CALF MEDIUM

## (undated) DEVICE — TUBING GYRUS ACMI COLLECTION SET 6FT

## (undated) DEVICE — TUBING ASPIRATION HANDLE

## (undated) DEVICE — LAP PAD W RING 18 X 18"

## (undated) DEVICE — GLV 7 PROTEXIS (WHITE)

## (undated) DEVICE — DRAPE LIGHT HANDLE COVER (GREEN)

## (undated) DEVICE — TUBING MEDI-VAC W MAXIGRIP CONNECTORS 1/4"X6'

## (undated) DEVICE — DRAPE 1/2 SHEET 40X57"

## (undated) DEVICE — PACK LITHOTOMY